# Patient Record
Sex: FEMALE | Race: WHITE | NOT HISPANIC OR LATINO
[De-identification: names, ages, dates, MRNs, and addresses within clinical notes are randomized per-mention and may not be internally consistent; named-entity substitution may affect disease eponyms.]

---

## 2017-06-16 ENCOUNTER — TRANSCRIPTION ENCOUNTER (OUTPATIENT)
Age: 82
End: 2017-06-16

## 2017-06-16 ENCOUNTER — OUTPATIENT (OUTPATIENT)
Dept: OUTPATIENT SERVICES | Facility: HOSPITAL | Age: 82
LOS: 1 days | End: 2017-06-16
Payer: MEDICARE

## 2017-06-16 VITALS
WEIGHT: 160.5 LBS | OXYGEN SATURATION: 99 % | RESPIRATION RATE: 13 BRPM | DIASTOLIC BLOOD PRESSURE: 57 MMHG | HEART RATE: 54 BPM | HEIGHT: 63.5 IN | SYSTOLIC BLOOD PRESSURE: 144 MMHG | TEMPERATURE: 98 F

## 2017-06-16 VITALS
SYSTOLIC BLOOD PRESSURE: 115 MMHG | OXYGEN SATURATION: 97 % | RESPIRATION RATE: 21 BRPM | HEART RATE: 56 BPM | DIASTOLIC BLOOD PRESSURE: 42 MMHG

## 2017-06-16 DIAGNOSIS — H25.11 AGE-RELATED NUCLEAR CATARACT, RIGHT EYE: ICD-10-CM

## 2017-06-16 PROCEDURE — 66982 XCAPSL CTRC RMVL CPLX WO ECP: CPT | Mod: RT

## 2017-06-16 NOTE — ASU PATIENT PROFILE, ADULT - PSH
AF (paroxysmal atrial fibrillation)  x 2 cardioversions - 9/2008, 2009 - chemical  Obesity (BMI 30-39.9)    S/P hysterectomy  2003  S/P lumpectomy of breast  left breast - with radiation 1990  S/P mastectomy, bilateral  january 2002  S/P shoulder surgery  right humerus fracture repair 1999

## 2017-06-16 NOTE — ASU DISCHARGE PLAN (ADULT/PEDIATRIC). - NOTIFY
Swelling that continues/Bleeding that does not stop/Fever greater than 101/Persistent Nausea and Vomiting/Pain not relieved by Medications

## 2017-06-16 NOTE — ASU PATIENT PROFILE, ADULT - PMH
Aortic stenosis  severe  Breast cancer  left breast  Hyperlipidemia    Hypertension    Paroxysmal a-fib

## 2017-09-01 ENCOUNTER — OUTPATIENT (OUTPATIENT)
Dept: OUTPATIENT SERVICES | Facility: HOSPITAL | Age: 82
LOS: 1 days | End: 2017-09-01
Payer: MEDICARE

## 2017-09-01 ENCOUNTER — APPOINTMENT (OUTPATIENT)
Dept: CARDIOLOGY | Facility: CLINIC | Age: 82
End: 2017-09-01
Payer: MEDICARE

## 2017-09-01 DIAGNOSIS — R07.9 CHEST PAIN, UNSPECIFIED: ICD-10-CM

## 2017-09-01 PROCEDURE — 75574 CT ANGIO HRT W/3D IMAGE: CPT | Mod: 26

## 2017-09-01 PROCEDURE — 75574 CT ANGIO HRT W/3D IMAGE: CPT

## 2017-09-11 ENCOUNTER — APPOINTMENT (OUTPATIENT)
Dept: CARDIOTHORACIC SURGERY | Facility: HOSPITAL | Age: 82
End: 2017-09-11

## 2017-09-11 ENCOUNTER — APPOINTMENT (OUTPATIENT)
Dept: CV DIAGNOSITCS | Facility: HOSPITAL | Age: 82
End: 2017-09-11

## 2017-09-26 ENCOUNTER — APPOINTMENT (OUTPATIENT)
Dept: CV DIAGNOSITCS | Facility: HOSPITAL | Age: 82
End: 2017-09-26

## 2017-09-26 ENCOUNTER — APPOINTMENT (OUTPATIENT)
Dept: CARDIOTHORACIC SURGERY | Facility: CLINIC | Age: 82
End: 2017-09-26
Payer: MEDICARE

## 2017-09-26 ENCOUNTER — OUTPATIENT (OUTPATIENT)
Dept: OUTPATIENT SERVICES | Facility: HOSPITAL | Age: 82
LOS: 1 days | End: 2017-09-26
Payer: SUBSIDIZED

## 2017-09-26 VITALS
RESPIRATION RATE: 14 BRPM | OXYGEN SATURATION: 98 % | DIASTOLIC BLOOD PRESSURE: 71 MMHG | TEMPERATURE: 97.5 F | SYSTOLIC BLOOD PRESSURE: 164 MMHG | HEIGHT: 64 IN | HEART RATE: 60 BPM | WEIGHT: 158 LBS | BODY MASS INDEX: 26.98 KG/M2

## 2017-09-26 DIAGNOSIS — I25.10 ATHEROSCLEROTIC HEART DISEASE OF NATIVE CORONARY ARTERY WITHOUT ANGINA PECTORIS: ICD-10-CM

## 2017-09-26 PROCEDURE — 93306 TTE W/DOPPLER COMPLETE: CPT

## 2017-09-26 PROCEDURE — 99213 OFFICE O/P EST LOW 20 MIN: CPT

## 2017-09-26 PROCEDURE — 93306 TTE W/DOPPLER COMPLETE: CPT | Mod: 26

## 2018-09-17 ENCOUNTER — APPOINTMENT (OUTPATIENT)
Dept: CARDIOTHORACIC SURGERY | Facility: CLINIC | Age: 83
End: 2018-09-17

## 2018-09-17 ENCOUNTER — APPOINTMENT (OUTPATIENT)
Dept: CV DIAGNOSITCS | Facility: HOSPITAL | Age: 83
End: 2018-09-17

## 2018-09-17 ENCOUNTER — APPOINTMENT (OUTPATIENT)
Dept: CARDIOTHORACIC SURGERY | Facility: HOSPITAL | Age: 83
End: 2018-09-17

## 2018-09-17 ENCOUNTER — NON-APPOINTMENT (OUTPATIENT)
Age: 83
End: 2018-09-17

## 2018-09-17 ENCOUNTER — OUTPATIENT (OUTPATIENT)
Dept: OUTPATIENT SERVICES | Facility: HOSPITAL | Age: 83
LOS: 1 days | End: 2018-09-17
Payer: SUBSIDIZED

## 2018-09-17 VITALS
TEMPERATURE: 98 F | SYSTOLIC BLOOD PRESSURE: 170 MMHG | BODY MASS INDEX: 25.27 KG/M2 | WEIGHT: 148 LBS | HEIGHT: 64 IN | OXYGEN SATURATION: 97 % | DIASTOLIC BLOOD PRESSURE: 77 MMHG | RESPIRATION RATE: 12 BRPM | HEART RATE: 65 BPM

## 2018-09-17 DIAGNOSIS — Z95.3 PRESENCE OF XENOGENIC HEART VALVE: ICD-10-CM

## 2018-09-17 DIAGNOSIS — I35.0 NONRHEUMATIC AORTIC (VALVE) STENOSIS: ICD-10-CM

## 2018-09-17 PROCEDURE — 93306 TTE W/DOPPLER COMPLETE: CPT

## 2018-09-17 PROCEDURE — 93306 TTE W/DOPPLER COMPLETE: CPT | Mod: 26

## 2018-10-09 PROBLEM — Z95.3 STATUS POST AORTIC VALVE REPLACEMENT WITH BIOPROSTHETIC VALVE: Status: ACTIVE | Noted: 2017-09-26

## 2018-10-25 ENCOUNTER — INPATIENT (INPATIENT)
Facility: HOSPITAL | Age: 83
LOS: 7 days | Discharge: NURSING, REHAB OR LONG-TERM CARE | DRG: 543 | End: 2018-11-02
Attending: INTERNAL MEDICINE | Admitting: INTERNAL MEDICINE
Payer: MEDICARE

## 2018-10-25 VITALS
RESPIRATION RATE: 17 BRPM | HEART RATE: 57 BPM | WEIGHT: 160.06 LBS | DIASTOLIC BLOOD PRESSURE: 83 MMHG | HEIGHT: 64 IN | TEMPERATURE: 98 F | SYSTOLIC BLOOD PRESSURE: 156 MMHG

## 2018-10-25 DIAGNOSIS — M54.5 LOW BACK PAIN: ICD-10-CM

## 2018-10-25 LAB
APPEARANCE UR: CLEAR — SIGNIFICANT CHANGE UP
APTT BLD: 39.8 SEC — HIGH (ref 27.5–37.4)
BILIRUB UR-MCNC: NEGATIVE — SIGNIFICANT CHANGE UP
COLOR SPEC: YELLOW — SIGNIFICANT CHANGE UP
DIFF PNL FLD: NEGATIVE — SIGNIFICANT CHANGE UP
GLUCOSE UR QL: NEGATIVE MG/DL — SIGNIFICANT CHANGE UP
HCT VFR BLD CALC: 40.5 % — SIGNIFICANT CHANGE UP (ref 34.5–45)
HGB BLD-MCNC: 13.6 G/DL — SIGNIFICANT CHANGE UP (ref 11.5–15.5)
INR BLD: 2.27 RATIO — HIGH (ref 0.88–1.16)
KETONES UR-MCNC: NEGATIVE — SIGNIFICANT CHANGE UP
LEUKOCYTE ESTERASE UR-ACNC: ABNORMAL
MCHC RBC-ENTMCNC: 30.9 PG — SIGNIFICANT CHANGE UP (ref 27–34)
MCHC RBC-ENTMCNC: 33.6 GM/DL — SIGNIFICANT CHANGE UP (ref 32–36)
MCV RBC AUTO: 92 FL — SIGNIFICANT CHANGE UP (ref 80–100)
NITRITE UR-MCNC: NEGATIVE — SIGNIFICANT CHANGE UP
NRBC # BLD: 0 /100 WBCS — SIGNIFICANT CHANGE UP (ref 0–0)
PH UR: 8 — SIGNIFICANT CHANGE UP (ref 5–8)
PLATELET # BLD AUTO: 143 K/UL — LOW (ref 150–400)
PROT UR-MCNC: NEGATIVE MG/DL — SIGNIFICANT CHANGE UP
PROTHROM AB SERPL-ACNC: 25.2 SEC — HIGH (ref 9.8–12.7)
RBC # BLD: 4.4 M/UL — SIGNIFICANT CHANGE UP (ref 3.8–5.2)
RBC # FLD: 13.3 % — SIGNIFICANT CHANGE UP (ref 10.3–14.5)
RBC CASTS # UR COMP ASSIST: SIGNIFICANT CHANGE UP /HPF (ref 0–4)
SP GR SPEC: 1.01 — SIGNIFICANT CHANGE UP (ref 1.01–1.02)
UROBILINOGEN FLD QL: NEGATIVE MG/DL — SIGNIFICANT CHANGE UP
WBC # BLD: 19.45 K/UL — HIGH (ref 3.8–10.5)
WBC # FLD AUTO: 19.45 K/UL — HIGH (ref 3.8–10.5)
WBC UR QL: SIGNIFICANT CHANGE UP

## 2018-10-25 PROCEDURE — 72100 X-RAY EXAM L-S SPINE 2/3 VWS: CPT | Mod: 26

## 2018-10-25 PROCEDURE — 99285 EMERGENCY DEPT VISIT HI MDM: CPT

## 2018-10-25 PROCEDURE — 93010 ELECTROCARDIOGRAM REPORT: CPT

## 2018-10-25 RX ORDER — HYDROCHLOROTHIAZIDE 25 MG
50 TABLET ORAL DAILY
Qty: 0 | Refills: 0 | Status: DISCONTINUED | OUTPATIENT
Start: 2018-10-25 | End: 2018-10-30

## 2018-10-25 RX ORDER — TRAMADOL HYDROCHLORIDE 50 MG/1
50 TABLET ORAL ONCE
Qty: 0 | Refills: 0 | Status: DISCONTINUED | OUTPATIENT
Start: 2018-10-25 | End: 2018-10-25

## 2018-10-25 RX ORDER — ATORVASTATIN CALCIUM 80 MG/1
20 TABLET, FILM COATED ORAL AT BEDTIME
Qty: 0 | Refills: 0 | Status: DISCONTINUED | OUTPATIENT
Start: 2018-10-25 | End: 2018-11-02

## 2018-10-25 RX ORDER — WARFARIN SODIUM 2.5 MG/1
2.5 TABLET ORAL ONCE
Qty: 0 | Refills: 0 | Status: DISCONTINUED | OUTPATIENT
Start: 2018-10-25 | End: 2018-10-26

## 2018-10-25 RX ORDER — ACETAMINOPHEN 500 MG
650 TABLET ORAL EVERY 6 HOURS
Qty: 0 | Refills: 0 | Status: DISCONTINUED | OUTPATIENT
Start: 2018-10-25 | End: 2018-11-02

## 2018-10-25 RX ORDER — DIAZEPAM 5 MG
5 TABLET ORAL EVERY 12 HOURS
Qty: 0 | Refills: 0 | Status: DISCONTINUED | OUTPATIENT
Start: 2018-10-25 | End: 2018-11-01

## 2018-10-25 RX ORDER — TRAMADOL HYDROCHLORIDE 50 MG/1
50 TABLET ORAL EVERY 6 HOURS
Qty: 0 | Refills: 0 | Status: DISCONTINUED | OUTPATIENT
Start: 2018-10-25 | End: 2018-11-01

## 2018-10-25 RX ORDER — AMIODARONE HYDROCHLORIDE 400 MG/1
50 TABLET ORAL
Qty: 0 | Refills: 0 | Status: DISCONTINUED | OUTPATIENT
Start: 2018-10-25 | End: 2018-10-26

## 2018-10-25 RX ORDER — METOPROLOL TARTRATE 50 MG
25 TABLET ORAL DAILY
Qty: 0 | Refills: 0 | Status: DISCONTINUED | OUTPATIENT
Start: 2018-10-25 | End: 2018-11-02

## 2018-10-25 RX ORDER — LISINOPRIL 2.5 MG/1
10 TABLET ORAL DAILY
Qty: 0 | Refills: 0 | Status: DISCONTINUED | OUTPATIENT
Start: 2018-10-25 | End: 2018-11-02

## 2018-10-25 RX ADMIN — TRAMADOL HYDROCHLORIDE 50 MILLIGRAM(S): 50 TABLET ORAL at 20:30

## 2018-10-25 RX ADMIN — TRAMADOL HYDROCHLORIDE 50 MILLIGRAM(S): 50 TABLET ORAL at 19:59

## 2018-10-25 NOTE — ED PROVIDER NOTE - NS_ ATTENDINGSCRIBEDETAILS _ED_A_ED_FT
.scribe The scribe's documentation has been prepared under my direction and personally reviewed by me in its entirety. I confirm that the note above accurately reflects all work, treatment, procedures, and medical decision making performed by me.

## 2018-10-25 NOTE — ED ADULT NURSE NOTE - ED STAT RN HANDOFF DETAILS
Patient unable to ambulate due to pain and prefers to be admitted. Awaiting admission. IV SL patent right hand. Moving all extremities equally. Patient endorsed to Ayleen García RN

## 2018-10-25 NOTE — ED PROVIDER NOTE - OBJECTIVE STATEMENT
84 y/o F pt with PMHx of severe spinal stenosis of lumbar region, herniated disc, AFib in 10/2016, mitral valve replacement presents to the ED c/o sudden onset of generalized lower back pain today. Pt was picking up a box in the garage when grasshoppers jumped in her face, she fell backwards and hit the lower region of the back where she has the spinal stenosis. She saw an orthopedist and had an MRI done 2 years ago. Pt takes Coumadin, desires to have Coumadin levels checked. Reports she occasionally needs to walk with a walker. Denies hitting head, legs, HA, currently having AFib. No further complaints at this time.

## 2018-10-25 NOTE — ED ADULT NURSE NOTE - NSIMPLEMENTINTERV_GEN_ALL_ED
Implemented All Fall with Harm Risk Interventions:  [place] to call system. Call bell, personal items and telephone within reach. Instruct patient to call for assistance. Room bathroom lighting operational. Non-slip footwear when patient is off stretcher. Physically safe environment: no spills, clutter or unnecessary equipment. Stretcher in lowest position, wheels locked, appropriate side rails in place. Provide visual cue, wrist band, yellow gown, etc. Monitor gait and stability. Monitor for mental status changes and reorient to person, place, and time. Review medications for side effects contributing to fall risk. Reinforce activity limits and safety measures with patient and family. Provide visual clues: red socks.

## 2018-10-25 NOTE — ED ADULT NURSE NOTE - STRIKING AGAINST
"Anesthesia Release from PACU Note    Patient: Abdias Kim    Procedure(s) Performed: Procedure(s) (LRB):  COLONOSCOPY (N/A)    Anesthesia type: general    Post pain: Adequate analgesia    Post assessment: no apparent anesthetic complications, tolerated procedure well and no evidence of recall    Last Vitals:   Visit Vitals    BP (!) 153/82    Pulse (!) 56    Temp 36.6 °C (97.8 °F) (Axillary)    Resp 18    Ht 5' 7" (1.702 m)    Wt 71.7 kg (158 lb)    SpO2 100%    BMI 24.75 kg/m2       Post vital signs: stable    Level of consciousness: awake, alert  and oriented    Nausea/Vomiting: no nausea/no vomiting    Complications: none    Airway Patency: patent    Respiratory: unassisted    Cardiovascular: stable and blood pressure at baseline    Hydration: euvolemic  "
cement

## 2018-10-25 NOTE — ED ADULT NURSE NOTE - CAS EDN DISCHARGE ASSESSMENT
Alert and oriented to person, place and time/Awake/Symptoms improved/No adverse reaction to first time med in ED

## 2018-10-25 NOTE — ED ADULT NURSE NOTE - OBJECTIVE STATEMENT
Patient fell backward in her garage when she picked up something and bugs flew out into her face and startled her. Denies head trauma. C/O low back pain. Patient was ambulatory at the scene.

## 2018-10-25 NOTE — ED ADULT NURSE NOTE - PMH
Aortic stenosis  severe  Breast cancer  left breast  Hyperlipidemia    Hypertension    Lumbar herniated disc    Paroxysmal a-fib    Spinal stenosis of lumbar region

## 2018-10-25 NOTE — ED PROVIDER NOTE - MEDICAL DECISION MAKING DETAILS
82 y/o F pt presents to the ED c/o sudden onset of generalized lower back pain today, desires to have Coumadin levels checked. Will do labs and CT L-Spine then reassess.

## 2018-10-25 NOTE — ED PROVIDER NOTE - CARE PLAN
Principal Discharge DX:	Acute low back pain without sciatica, unspecified back pain laterality  Secondary Diagnosis:	Fall, initial encounter Principal Discharge DX:	Acute low back pain without sciatica, unspecified back pain laterality  Secondary Diagnosis:	Fall, initial encounter  Secondary Diagnosis:	Closed compression fracture of second lumbar vertebra, initial encounter

## 2018-10-25 NOTE — ED ADULT TRIAGE NOTE - CHIEF COMPLAINT QUOTE
"I was startled in my garage when I picked up a basket and bugs flew out of in my face. I fell backward and hurt my low back."

## 2018-10-25 NOTE — ED PROVIDER NOTE - PROGRESS NOTE DETAILS
pt recvd in signout from dr Mccormick, she does now not want to be admitted, will give po meds and re asses for dc she has been on the phone with her family (son is a physician) pt re eval she is comfortable but still unable to sit up or move about her bed, agrees to admit. dr quintero on call medicine paged pmd dr steven goldberg in [place]

## 2018-10-25 NOTE — ED ADULT NURSE REASSESSMENT NOTE - NS ED NURSE REASSESS COMMENT FT1
Attempted to assist patient OOB to see if she can ambulate. Patient unable to sit up and get off the stretcher because her right lower back pain is too severe. Initially patient had declined pain med but now feels she is ready for pain med. Dr. Mccormick aware. Patient also states she lives alone and she does not feel she is able to go home as she is in too much pain. Dr. Mccormick aware.
Patient refused CT as ordered states she prefers a plain x-ray or an MRI. Dr. Mccormick aware. Plain x-ray to be ordered.
Pt received awake and alert, assisted to reposition for comfort. Pt is c/o lower back pain at rest, worse with movement. Pt able to move all extremities equal and strong, denies any numbness or tingling. Pt is unable to ambulate at this time and wants to be admitted to the hospital. Plan to be d/w MD.
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
## 2018-10-26 DIAGNOSIS — M51.26 OTHER INTERVERTEBRAL DISC DISPLACEMENT, LUMBAR REGION: ICD-10-CM

## 2018-10-26 DIAGNOSIS — E78.5 HYPERLIPIDEMIA, UNSPECIFIED: ICD-10-CM

## 2018-10-26 DIAGNOSIS — Z29.9 ENCOUNTER FOR PROPHYLACTIC MEASURES, UNSPECIFIED: ICD-10-CM

## 2018-10-26 DIAGNOSIS — I10 ESSENTIAL (PRIMARY) HYPERTENSION: ICD-10-CM

## 2018-10-26 DIAGNOSIS — M54.5 LOW BACK PAIN: ICD-10-CM

## 2018-10-26 DIAGNOSIS — I48.0 PAROXYSMAL ATRIAL FIBRILLATION: ICD-10-CM

## 2018-10-26 LAB
ALBUMIN SERPL ELPH-MCNC: 3.7 G/DL — SIGNIFICANT CHANGE UP (ref 3.3–5)
ALP SERPL-CCNC: 94 U/L — SIGNIFICANT CHANGE UP (ref 30–120)
ALT FLD-CCNC: 28 U/L DA — SIGNIFICANT CHANGE UP (ref 10–60)
ANION GAP SERPL CALC-SCNC: 7 MMOL/L — SIGNIFICANT CHANGE UP (ref 5–17)
ANION GAP SERPL CALC-SCNC: 7 MMOL/L — SIGNIFICANT CHANGE UP (ref 5–17)
AST SERPL-CCNC: 21 U/L — SIGNIFICANT CHANGE UP (ref 10–40)
BASOPHILS # BLD AUTO: 0.02 K/UL — SIGNIFICANT CHANGE UP (ref 0–0.2)
BASOPHILS NFR BLD AUTO: 0.2 % — SIGNIFICANT CHANGE UP (ref 0–2)
BILIRUB SERPL-MCNC: 0.9 MG/DL — SIGNIFICANT CHANGE UP (ref 0.2–1.2)
BUN SERPL-MCNC: 29 MG/DL — HIGH (ref 7–23)
BUN SERPL-MCNC: 31 MG/DL — HIGH (ref 7–23)
CALCIUM SERPL-MCNC: 9.3 MG/DL — SIGNIFICANT CHANGE UP (ref 8.4–10.5)
CALCIUM SERPL-MCNC: 9.6 MG/DL — SIGNIFICANT CHANGE UP (ref 8.4–10.5)
CHLORIDE SERPL-SCNC: 101 MMOL/L — SIGNIFICANT CHANGE UP (ref 96–108)
CHLORIDE SERPL-SCNC: 101 MMOL/L — SIGNIFICANT CHANGE UP (ref 96–108)
CHOLEST SERPL-MCNC: 141 MG/DL — SIGNIFICANT CHANGE UP (ref 10–199)
CO2 SERPL-SCNC: 30 MMOL/L — SIGNIFICANT CHANGE UP (ref 22–31)
CO2 SERPL-SCNC: 31 MMOL/L — SIGNIFICANT CHANGE UP (ref 22–31)
CREAT SERPL-MCNC: 1.11 MG/DL — SIGNIFICANT CHANGE UP (ref 0.5–1.3)
CREAT SERPL-MCNC: 1.18 MG/DL — SIGNIFICANT CHANGE UP (ref 0.5–1.3)
EOSINOPHIL # BLD AUTO: 0.19 K/UL — SIGNIFICANT CHANGE UP (ref 0–0.5)
EOSINOPHIL NFR BLD AUTO: 1.7 % — SIGNIFICANT CHANGE UP (ref 0–6)
GLUCOSE SERPL-MCNC: 113 MG/DL — HIGH (ref 70–99)
GLUCOSE SERPL-MCNC: 129 MG/DL — HIGH (ref 70–99)
HBA1C BLD-MCNC: 5.3 % — SIGNIFICANT CHANGE UP (ref 4–5.6)
HCT VFR BLD CALC: 35.2 % — SIGNIFICANT CHANGE UP (ref 34.5–45)
HCT VFR BLD CALC: 38.5 % — SIGNIFICANT CHANGE UP (ref 34.5–45)
HDLC SERPL-MCNC: 61 MG/DL — SIGNIFICANT CHANGE UP
HGB BLD-MCNC: 12 G/DL — SIGNIFICANT CHANGE UP (ref 11.5–15.5)
HGB BLD-MCNC: 13 G/DL — SIGNIFICANT CHANGE UP (ref 11.5–15.5)
IMM GRANULOCYTES NFR BLD AUTO: 0.2 % — SIGNIFICANT CHANGE UP (ref 0–1.5)
INR BLD: 2.25 RATIO — HIGH (ref 0.88–1.16)
LIPID PNL WITH DIRECT LDL SERPL: 68 MG/DL — SIGNIFICANT CHANGE UP
LYMPHOCYTES # BLD AUTO: 1.12 K/UL — SIGNIFICANT CHANGE UP (ref 1–3.3)
LYMPHOCYTES # BLD AUTO: 9.9 % — LOW (ref 13–44)
MAGNESIUM SERPL-MCNC: 2 MG/DL — SIGNIFICANT CHANGE UP (ref 1.6–2.6)
MCHC RBC-ENTMCNC: 30.5 PG — SIGNIFICANT CHANGE UP (ref 27–34)
MCHC RBC-ENTMCNC: 30.6 PG — SIGNIFICANT CHANGE UP (ref 27–34)
MCHC RBC-ENTMCNC: 33.8 GM/DL — SIGNIFICANT CHANGE UP (ref 32–36)
MCHC RBC-ENTMCNC: 34.1 GM/DL — SIGNIFICANT CHANGE UP (ref 32–36)
MCV RBC AUTO: 89.6 FL — SIGNIFICANT CHANGE UP (ref 80–100)
MCV RBC AUTO: 90.6 FL — SIGNIFICANT CHANGE UP (ref 80–100)
MONOCYTES # BLD AUTO: 0.78 K/UL — SIGNIFICANT CHANGE UP (ref 0–0.9)
MONOCYTES NFR BLD AUTO: 6.9 % — SIGNIFICANT CHANGE UP (ref 2–14)
NEUTROPHILS # BLD AUTO: 9.22 K/UL — HIGH (ref 1.8–7.4)
NEUTROPHILS NFR BLD AUTO: 81.1 % — HIGH (ref 43–77)
NRBC # BLD: 0 /100 WBCS — SIGNIFICANT CHANGE UP (ref 0–0)
PHOSPHATE SERPL-MCNC: 3.4 MG/DL — SIGNIFICANT CHANGE UP (ref 2.5–4.5)
PLATELET # BLD AUTO: 129 K/UL — LOW (ref 150–400)
PLATELET # BLD AUTO: 132 K/UL — LOW (ref 150–400)
POTASSIUM SERPL-MCNC: 4 MMOL/L — SIGNIFICANT CHANGE UP (ref 3.5–5.3)
POTASSIUM SERPL-MCNC: 4 MMOL/L — SIGNIFICANT CHANGE UP (ref 3.5–5.3)
POTASSIUM SERPL-SCNC: 4 MMOL/L — SIGNIFICANT CHANGE UP (ref 3.5–5.3)
POTASSIUM SERPL-SCNC: 4 MMOL/L — SIGNIFICANT CHANGE UP (ref 3.5–5.3)
PROT SERPL-MCNC: 7.2 G/DL — SIGNIFICANT CHANGE UP (ref 6–8.3)
PROTHROM AB SERPL-ACNC: 24.9 SEC — HIGH (ref 9.8–12.7)
RBC # BLD: 3.93 M/UL — SIGNIFICANT CHANGE UP (ref 3.8–5.2)
RBC # BLD: 4.25 M/UL — SIGNIFICANT CHANGE UP (ref 3.8–5.2)
RBC # FLD: 13.2 % — SIGNIFICANT CHANGE UP (ref 10.3–14.5)
RBC # FLD: 13.4 % — SIGNIFICANT CHANGE UP (ref 10.3–14.5)
SODIUM SERPL-SCNC: 138 MMOL/L — SIGNIFICANT CHANGE UP (ref 135–145)
SODIUM SERPL-SCNC: 139 MMOL/L — SIGNIFICANT CHANGE UP (ref 135–145)
TOTAL CHOLESTEROL/HDL RATIO MEASUREMENT: 2.3 RATIO — LOW (ref 3.3–7.1)
TRIGL SERPL-MCNC: 60 MG/DL — SIGNIFICANT CHANGE UP (ref 10–149)
WBC # BLD: 11.35 K/UL — HIGH (ref 3.8–10.5)
WBC # BLD: 14.29 K/UL — HIGH (ref 3.8–10.5)
WBC # FLD AUTO: 11.35 K/UL — HIGH (ref 3.8–10.5)
WBC # FLD AUTO: 14.29 K/UL — HIGH (ref 3.8–10.5)

## 2018-10-26 PROCEDURE — 71045 X-RAY EXAM CHEST 1 VIEW: CPT | Mod: 26

## 2018-10-26 RX ORDER — AMIODARONE HYDROCHLORIDE 400 MG/1
100 TABLET ORAL ONCE
Qty: 0 | Refills: 0 | Status: COMPLETED | OUTPATIENT
Start: 2018-10-26 | End: 2018-10-26

## 2018-10-26 RX ORDER — WARFARIN SODIUM 2.5 MG/1
2.5 TABLET ORAL ONCE
Qty: 0 | Refills: 0 | Status: COMPLETED | OUTPATIENT
Start: 2018-10-26 | End: 2018-10-26

## 2018-10-26 RX ORDER — AMIODARONE HYDROCHLORIDE 400 MG/1
100 TABLET ORAL
Qty: 0 | Refills: 0 | Status: DISCONTINUED | OUTPATIENT
Start: 2018-10-29 | End: 2018-11-02

## 2018-10-26 RX ADMIN — Medication 25 MILLIGRAM(S): at 21:18

## 2018-10-26 RX ADMIN — LISINOPRIL 10 MILLIGRAM(S): 2.5 TABLET ORAL at 17:40

## 2018-10-26 RX ADMIN — TRAMADOL HYDROCHLORIDE 50 MILLIGRAM(S): 50 TABLET ORAL at 17:40

## 2018-10-26 RX ADMIN — Medication 5 MILLIGRAM(S): at 19:56

## 2018-10-26 RX ADMIN — TRAMADOL HYDROCHLORIDE 50 MILLIGRAM(S): 50 TABLET ORAL at 18:15

## 2018-10-26 RX ADMIN — AMIODARONE HYDROCHLORIDE 100 MILLIGRAM(S): 400 TABLET ORAL at 11:30

## 2018-10-26 RX ADMIN — WARFARIN SODIUM 2.5 MILLIGRAM(S): 2.5 TABLET ORAL at 21:18

## 2018-10-26 RX ADMIN — ATORVASTATIN CALCIUM 20 MILLIGRAM(S): 80 TABLET, FILM COATED ORAL at 21:18

## 2018-10-26 RX ADMIN — TRAMADOL HYDROCHLORIDE 50 MILLIGRAM(S): 50 TABLET ORAL at 02:55

## 2018-10-26 RX ADMIN — TRAMADOL HYDROCHLORIDE 50 MILLIGRAM(S): 50 TABLET ORAL at 09:18

## 2018-10-26 RX ADMIN — TRAMADOL HYDROCHLORIDE 50 MILLIGRAM(S): 50 TABLET ORAL at 00:03

## 2018-10-26 RX ADMIN — TRAMADOL HYDROCHLORIDE 50 MILLIGRAM(S): 50 TABLET ORAL at 09:50

## 2018-10-26 NOTE — H&P ADULT - NSHPSOCIALHISTORY_GEN_ALL_CORE
Social History:    Marital Status:   Occupation:   Lives with:     Substance Use :  Tobacco Usage:  (   ) never smoked   (   ) former smoker   (   ) current smoker  (     ) pack year  (        ) last tobacco use date  Alcohol Usage:    (     ) Advanced Directives: (     ) declined   [  ] health care proxy Social History:    Marital Status:   Occupation: Retired  Lives with: Alone    Substance Use :  Tobacco Usage:  (X) never smoked   (   ) former smoker   (   ) current smoker  (     ) pack year  (        ) last tobacco use date  Alcohol Usage: Denies    (X) Advanced Directives: (     ) declined   [X] health care proxy

## 2018-10-26 NOTE — H&P ADULT - ASSESSMENT
83 years old female with past medical history of Atrial Fibrillation, Severe spinal stenosis, herniated disc in back, Mitral valve replacement, Old compression fracture of lumbar spine, who fell backwards on her buttocks while picking up a box in her garage. Patient developed back pain and was brought in to ER.     She was given pain medications and was tried to be ambulated. Patient couldn't ambulate and was admitted for pain control and further management.

## 2018-10-26 NOTE — H&P ADULT - NSHPREVIEWOFSYSTEMS_GEN_ALL_CORE
REVIEW OF SYSTEMS:  CONSTITUTIONAL: No fever, weight loss, or fatigue  EYES: No eye pain, visual disturbances, or discharge  ENMT:  No difficulty hearing, tinnitus, vertigo; No sinus or throat pain  NECK: No pain or stiffness  BREASTS: No pain, masses, or nipple discharge  RESPIRATORY: No cough, wheezing, chills or hemoptysis; No shortness of breath  CARDIOVASCULAR: No chest pain, palpitations, dizziness, or leg swelling  GASTROINTESTINAL: No abdominal or epigastric pain. No nausea, vomiting, or hematemesis; No diarrhea or constipation. No melena or hematochezia.  GENITOURINARY: No dysuria, frequency, hematuria, or incontinence  NEUROLOGICAL: No headaches, memory loss, loss of strength, numbness, or tremors  SKIN: No itching, burning, rashes, or lesions   LYMPH NODES: No enlarged glands  ENDOCRINE: No heat or cold intolerance; No hair loss; No polydipsia or polyuria  MUSCULOSKELETAL: + back pain  PSYCHIATRIC: No depression, anxiety, mood swings, or difficulty sleeping  HEME/LYMPH: No easy bruising, or bleeding gums  ALLERGY AND IMMUNOLOGIC: No hives or eczema

## 2018-10-26 NOTE — H&P ADULT - PROBLEM SELECTOR PLAN 1
Patient is acute low back pain possibly secondary to contusion from fall.  Will get MRI of the lumbosacral spine to rule out any Acute compression fractures.  Continue patient on pain medications as needed.  Encourage ambulation with physical therapy.  Will benefit from rehabilitation.

## 2018-10-26 NOTE — DIETITIAN INITIAL EVALUATION ADULT. - OTHER INFO
84 y/o F pt seen as per coumadin policy. Pt has PMHx of spinal stenosis, AFib, mitral valve replacement presents to the ED c/o lower back pain after a fall. States PTA oral intake is good and UBW is in the 150s (admission weight 159# 10/25). Pt is aware of coumadin interactions and alters med dose to her PT/INR. States she has a family member who is a cardiologist and oversees this. Pt states was not able to eat breakfast secondary to lower back pain. Mentions difficulty sitting up to eat meals, recommending tray set up to assist. Preferences were obtained. Allergic to caffeine. Skin is intact. Nutrition remains available PRN.

## 2018-10-26 NOTE — H&P ADULT - HISTORY OF PRESENT ILLNESS
83 years old female with past medical history of Atrial Fibrillation, Severe spinal stenosis, herniated disc in back, Mitral valve replacement, Old compression fracture of lumbar spine, who fell backwards on her buttocks while picking up a box in her garage. Patient developed back pain and was brought in to ER.     She was given pain medications and was tried to be ambulated. Patient couldn't ambulate and was admitted for pain control and further management.     patient continues to c/o back pain. Denies any numbness or weakness. No fever or chills. No bowel or bladder control issuers. No nausea or vomiting. No LOC. No head injury.

## 2018-10-26 NOTE — H&P ADULT - NSHPLABSRESULTS_GEN_ALL_CORE
12.0   11.35 )-----------( 129      ( 26 Oct 2018 06:48 )             35.2     26 Oct 2018 06:48    138    |  101    |  29     ----------------------------<  113    4.0     |  30     |  1.11     Ca    9.3        26 Oct 2018 06:48  Phos  3.4       26 Oct 2018 06:48  Mg     2.0       26 Oct 2018 06:48    TPro  7.2    /  Alb  3.7    /  TBili  0.9    /  DBili  x      /  AST  21     /  ALT  28     /  AlkPhos  94     26 Oct 2018 00:49    CAPILLARY BLOOD GLUCOSE    PT/INR - ( 26 Oct 2018 06:48 )   PT: 24.9 sec;   INR: 2.25 ratio         PTT - ( 25 Oct 2018 15:59 )  PTT:39.8 sec  10-26 HgdbpqwrgxT1C 5.3    10-26 Chol 141 LDL 68 HDL 61 Trig 60

## 2018-10-26 NOTE — H&P ADULT - PROBLEM SELECTOR PLAN 5
Continue patient on lisinopril, hydrochlorothiazide and metoprolol with holding parameters.  Monitor blood pressure per routine.

## 2018-10-26 NOTE — PHYSICAL THERAPY INITIAL EVALUATION ADULT - PERTINENT HX OF CURRENT PROBLEM, REHAB EVAL
Pt. s/p fall.  Pt. was in her garage lifting a box when crickets jumped out and caused pt fell backwards.  L-Spine xray->old L2 compression fx.

## 2018-10-26 NOTE — H&P ADULT - NSHPPHYSICALEXAM_GEN_ALL_CORE
Vital Signs Last 24 Hrs  T(C): 37.6 (26 Oct 2018 20:19), Max: 37.6 (26 Oct 2018 20:19)  T(F): 99.7 (26 Oct 2018 20:19), Max: 99.7 (26 Oct 2018 20:19)  HR: 70 (26 Oct 2018 20:19) (62 - 74)  BP: 122/66 (26 Oct 2018 20:19) (108/62 - 132/64)  BP(mean): --  RR: 16 (26 Oct 2018 20:19) (16 - 18)  SpO2: 94% (26 Oct 2018 20:19) (90% - 98%) Vital Signs Last 24 Hrs  T(C): 37.6 (26 Oct 2018 20:19), Max: 37.6 (26 Oct 2018 20:19)  T(F): 99.7 (26 Oct 2018 20:19), Max: 99.7 (26 Oct 2018 20:19)  HR: 70 (26 Oct 2018 20:19) (62 - 74)  BP: 122/66 (26 Oct 2018 20:19) (108/62 - 132/64)  BP(mean): --  RR: 16 (26 Oct 2018 20:19) (16 - 18)  SpO2: 94% (26 Oct 2018 20:19) (90% - 98%)    PHYSICAL EXAM:  GENERAL: NAD, well-groomed, well-developed  HEAD:  Atraumatic, Normocephalic  EYES: EOMI, PERRLA, conjunctiva and sclera clear  ENMT: Moist mucous membranes, No lesions  NECK: Supple,   CHEST/LUNG: Clear to auscultation bilaterally; No rales, rhonchi, wheezing, or rubs  HEART: S1, S2, SM +  ABDOMEN: Soft, Nontender, Nondistended; Bowel sounds present  EXTREMITIES:  2+ Peripheral Pulses, No clubbing, cyanosis, or edema  MS: Tenderness in lower back. No deformity noted.    LYMPH: No lymphadenopathy noted  SKIN: No rashes or lesions  NERVOUS SYSTEM:  No focal deficit.   PSYCH:  Awake and alert.

## 2018-10-27 LAB
ANION GAP SERPL CALC-SCNC: 7 MMOL/L — SIGNIFICANT CHANGE UP (ref 5–17)
BUN SERPL-MCNC: 26 MG/DL — HIGH (ref 7–23)
CALCIUM SERPL-MCNC: 9.1 MG/DL — SIGNIFICANT CHANGE UP (ref 8.4–10.5)
CHLORIDE SERPL-SCNC: 100 MMOL/L — SIGNIFICANT CHANGE UP (ref 96–108)
CO2 SERPL-SCNC: 30 MMOL/L — SIGNIFICANT CHANGE UP (ref 22–31)
CREAT SERPL-MCNC: 1.13 MG/DL — SIGNIFICANT CHANGE UP (ref 0.5–1.3)
GLUCOSE SERPL-MCNC: 106 MG/DL — HIGH (ref 70–99)
HCT VFR BLD CALC: 35 % — SIGNIFICANT CHANGE UP (ref 34.5–45)
HGB BLD-MCNC: 11.6 G/DL — SIGNIFICANT CHANGE UP (ref 11.5–15.5)
INR BLD: 2.52 RATIO — HIGH (ref 0.88–1.16)
MCHC RBC-ENTMCNC: 30.1 PG — SIGNIFICANT CHANGE UP (ref 27–34)
MCHC RBC-ENTMCNC: 33.1 GM/DL — SIGNIFICANT CHANGE UP (ref 32–36)
MCV RBC AUTO: 90.7 FL — SIGNIFICANT CHANGE UP (ref 80–100)
NRBC # BLD: 0 /100 WBCS — SIGNIFICANT CHANGE UP (ref 0–0)
PLATELET # BLD AUTO: 119 K/UL — LOW (ref 150–400)
POTASSIUM SERPL-MCNC: 4 MMOL/L — SIGNIFICANT CHANGE UP (ref 3.5–5.3)
POTASSIUM SERPL-SCNC: 4 MMOL/L — SIGNIFICANT CHANGE UP (ref 3.5–5.3)
PROTHROM AB SERPL-ACNC: 28 SEC — HIGH (ref 9.8–12.7)
RBC # BLD: 3.86 M/UL — SIGNIFICANT CHANGE UP (ref 3.8–5.2)
RBC # FLD: 13.8 % — SIGNIFICANT CHANGE UP (ref 10.3–14.5)
SODIUM SERPL-SCNC: 137 MMOL/L — SIGNIFICANT CHANGE UP (ref 135–145)
T3 SERPL-MCNC: 68 NG/DL — LOW (ref 80–200)
T4 AB SER-ACNC: 7 UG/DL — SIGNIFICANT CHANGE UP (ref 4.6–12)
TSH SERPL-MCNC: 1.84 UIU/ML — SIGNIFICANT CHANGE UP (ref 0.27–4.2)
WBC # BLD: 11.29 K/UL — HIGH (ref 3.8–10.5)
WBC # FLD AUTO: 11.29 K/UL — HIGH (ref 3.8–10.5)

## 2018-10-27 RX ORDER — LIDOCAINE 4 G/100G
1 CREAM TOPICAL DAILY
Qty: 0 | Refills: 0 | Status: DISCONTINUED | OUTPATIENT
Start: 2018-10-27 | End: 2018-11-02

## 2018-10-27 RX ORDER — WARFARIN SODIUM 2.5 MG/1
2.5 TABLET ORAL ONCE
Qty: 0 | Refills: 0 | Status: COMPLETED | OUTPATIENT
Start: 2018-10-27 | End: 2018-10-27

## 2018-10-27 RX ADMIN — ATORVASTATIN CALCIUM 20 MILLIGRAM(S): 80 TABLET, FILM COATED ORAL at 21:50

## 2018-10-27 RX ADMIN — Medication 5 MILLIGRAM(S): at 11:13

## 2018-10-27 RX ADMIN — TRAMADOL HYDROCHLORIDE 50 MILLIGRAM(S): 50 TABLET ORAL at 12:13

## 2018-10-27 RX ADMIN — LIDOCAINE 1 PATCH: 4 CREAM TOPICAL at 20:16

## 2018-10-27 RX ADMIN — WARFARIN SODIUM 2.5 MILLIGRAM(S): 2.5 TABLET ORAL at 21:50

## 2018-10-27 RX ADMIN — LIDOCAINE 1 PATCH: 4 CREAM TOPICAL at 16:46

## 2018-10-27 RX ADMIN — TRAMADOL HYDROCHLORIDE 50 MILLIGRAM(S): 50 TABLET ORAL at 11:13

## 2018-10-27 RX ADMIN — LISINOPRIL 10 MILLIGRAM(S): 2.5 TABLET ORAL at 08:49

## 2018-10-27 RX ADMIN — TRAMADOL HYDROCHLORIDE 50 MILLIGRAM(S): 50 TABLET ORAL at 01:00

## 2018-10-27 RX ADMIN — Medication 25 MILLIGRAM(S): at 17:40

## 2018-10-27 RX ADMIN — TRAMADOL HYDROCHLORIDE 50 MILLIGRAM(S): 50 TABLET ORAL at 00:24

## 2018-10-28 LAB
INR BLD: 2.22 RATIO — HIGH (ref 0.88–1.16)
PROTHROM AB SERPL-ACNC: 24.6 SEC — HIGH (ref 9.8–12.7)

## 2018-10-28 PROCEDURE — 73522 X-RAY EXAM HIPS BI 3-4 VIEWS: CPT | Mod: 26

## 2018-10-28 PROCEDURE — 12345: CPT | Mod: NC

## 2018-10-28 PROCEDURE — 74019 RADEX ABDOMEN 2 VIEWS: CPT | Mod: 26

## 2018-10-28 RX ORDER — POLYETHYLENE GLYCOL 3350 17 G/17G
17 POWDER, FOR SOLUTION ORAL
Qty: 0 | Refills: 0 | Status: DISCONTINUED | OUTPATIENT
Start: 2018-10-28 | End: 2018-11-02

## 2018-10-28 RX ORDER — DOCUSATE SODIUM 100 MG
100 CAPSULE ORAL THREE TIMES A DAY
Qty: 0 | Refills: 0 | Status: DISCONTINUED | OUTPATIENT
Start: 2018-10-28 | End: 2018-11-02

## 2018-10-28 RX ORDER — SENNA PLUS 8.6 MG/1
2 TABLET ORAL AT BEDTIME
Qty: 0 | Refills: 0 | Status: DISCONTINUED | OUTPATIENT
Start: 2018-10-28 | End: 2018-11-02

## 2018-10-28 RX ORDER — WARFARIN SODIUM 2.5 MG/1
2.5 TABLET ORAL ONCE
Qty: 0 | Refills: 0 | Status: COMPLETED | OUTPATIENT
Start: 2018-10-28 | End: 2018-10-28

## 2018-10-28 RX ORDER — MAGNESIUM HYDROXIDE 400 MG/1
30 TABLET, CHEWABLE ORAL DAILY
Qty: 0 | Refills: 0 | Status: DISCONTINUED | OUTPATIENT
Start: 2018-10-28 | End: 2018-11-02

## 2018-10-28 RX ADMIN — WARFARIN SODIUM 2.5 MILLIGRAM(S): 2.5 TABLET ORAL at 20:59

## 2018-10-28 RX ADMIN — Medication 100 MILLIGRAM(S): at 21:00

## 2018-10-28 RX ADMIN — LIDOCAINE 1 PATCH: 4 CREAM TOPICAL at 11:48

## 2018-10-28 RX ADMIN — Medication 5 MILLIGRAM(S): at 16:36

## 2018-10-28 RX ADMIN — Medication 50 MILLIGRAM(S): at 06:04

## 2018-10-28 RX ADMIN — ATORVASTATIN CALCIUM 20 MILLIGRAM(S): 80 TABLET, FILM COATED ORAL at 21:00

## 2018-10-28 RX ADMIN — TRAMADOL HYDROCHLORIDE 50 MILLIGRAM(S): 50 TABLET ORAL at 09:15

## 2018-10-28 RX ADMIN — LISINOPRIL 10 MILLIGRAM(S): 2.5 TABLET ORAL at 06:04

## 2018-10-28 RX ADMIN — LIDOCAINE 1 PATCH: 4 CREAM TOPICAL at 23:17

## 2018-10-28 RX ADMIN — TRAMADOL HYDROCHLORIDE 50 MILLIGRAM(S): 50 TABLET ORAL at 02:50

## 2018-10-28 RX ADMIN — Medication 25 MILLIGRAM(S): at 20:59

## 2018-10-28 RX ADMIN — Medication 5 MILLIGRAM(S): at 02:17

## 2018-10-28 RX ADMIN — TRAMADOL HYDROCHLORIDE 50 MILLIGRAM(S): 50 TABLET ORAL at 08:45

## 2018-10-28 RX ADMIN — Medication 100 MILLIGRAM(S): at 13:07

## 2018-10-28 RX ADMIN — LIDOCAINE 1 PATCH: 4 CREAM TOPICAL at 04:20

## 2018-10-28 RX ADMIN — TRAMADOL HYDROCHLORIDE 50 MILLIGRAM(S): 50 TABLET ORAL at 02:17

## 2018-10-28 RX ADMIN — SENNA PLUS 2 TABLET(S): 8.6 TABLET ORAL at 21:00

## 2018-10-28 RX ADMIN — POLYETHYLENE GLYCOL 3350 17 GRAM(S): 17 POWDER, FOR SOLUTION ORAL at 17:38

## 2018-10-29 ENCOUNTER — OUTPATIENT (OUTPATIENT)
Dept: OUTPATIENT SERVICES | Facility: HOSPITAL | Age: 83
LOS: 1 days | End: 2018-10-29
Payer: COMMERCIAL

## 2018-10-29 DIAGNOSIS — M54.5 LOW BACK PAIN: ICD-10-CM

## 2018-10-29 PROBLEM — M48.061 SPINAL STENOSIS, LUMBAR REGION WITHOUT NEUROGENIC CLAUDICATION: Chronic | Status: ACTIVE | Noted: 2018-10-25

## 2018-10-29 PROBLEM — M51.26 OTHER INTERVERTEBRAL DISC DISPLACEMENT, LUMBAR REGION: Chronic | Status: ACTIVE | Noted: 2018-10-25

## 2018-10-29 LAB
ANION GAP SERPL CALC-SCNC: 9 MMOL/L — SIGNIFICANT CHANGE UP (ref 5–17)
ANION GAP SERPL CALC-SCNC: 9 MMOL/L — SIGNIFICANT CHANGE UP (ref 5–17)
BUN SERPL-MCNC: 42 MG/DL — HIGH (ref 7–23)
BUN SERPL-MCNC: 51 MG/DL — HIGH (ref 7–23)
CALCIUM SERPL-MCNC: 9.4 MG/DL — SIGNIFICANT CHANGE UP (ref 8.4–10.5)
CALCIUM SERPL-MCNC: 9.8 MG/DL — SIGNIFICANT CHANGE UP (ref 8.4–10.5)
CHLORIDE SERPL-SCNC: 99 MMOL/L — SIGNIFICANT CHANGE UP (ref 96–108)
CHLORIDE SERPL-SCNC: 99 MMOL/L — SIGNIFICANT CHANGE UP (ref 96–108)
CO2 SERPL-SCNC: 29 MMOL/L — SIGNIFICANT CHANGE UP (ref 22–31)
CO2 SERPL-SCNC: 30 MMOL/L — SIGNIFICANT CHANGE UP (ref 22–31)
CREAT SERPL-MCNC: 1.28 MG/DL — SIGNIFICANT CHANGE UP (ref 0.5–1.3)
CREAT SERPL-MCNC: 1.41 MG/DL — HIGH (ref 0.5–1.3)
GLUCOSE BLDC GLUCOMTR-MCNC: 100 MG/DL — HIGH (ref 70–99)
GLUCOSE SERPL-MCNC: 115 MG/DL — HIGH (ref 70–99)
GLUCOSE SERPL-MCNC: 125 MG/DL — HIGH (ref 70–99)
HCT VFR BLD CALC: 35.3 % — SIGNIFICANT CHANGE UP (ref 34.5–45)
HGB BLD-MCNC: 12 G/DL — SIGNIFICANT CHANGE UP (ref 11.5–15.5)
INR BLD: 2.16 RATIO — HIGH (ref 0.88–1.16)
MCHC RBC-ENTMCNC: 30.2 PG — SIGNIFICANT CHANGE UP (ref 27–34)
MCHC RBC-ENTMCNC: 34 GM/DL — SIGNIFICANT CHANGE UP (ref 32–36)
MCV RBC AUTO: 88.7 FL — SIGNIFICANT CHANGE UP (ref 80–100)
NRBC # BLD: 0 /100 WBCS — SIGNIFICANT CHANGE UP (ref 0–0)
PLATELET # BLD AUTO: 147 K/UL — LOW (ref 150–400)
POTASSIUM SERPL-MCNC: 3.7 MMOL/L — SIGNIFICANT CHANGE UP (ref 3.5–5.3)
POTASSIUM SERPL-MCNC: 3.9 MMOL/L — SIGNIFICANT CHANGE UP (ref 3.5–5.3)
POTASSIUM SERPL-SCNC: 3.7 MMOL/L — SIGNIFICANT CHANGE UP (ref 3.5–5.3)
POTASSIUM SERPL-SCNC: 3.9 MMOL/L — SIGNIFICANT CHANGE UP (ref 3.5–5.3)
PROTHROM AB SERPL-ACNC: 23.9 SEC — HIGH (ref 9.8–12.7)
RBC # BLD: 3.98 M/UL — SIGNIFICANT CHANGE UP (ref 3.8–5.2)
RBC # FLD: 13.4 % — SIGNIFICANT CHANGE UP (ref 10.3–14.5)
SODIUM SERPL-SCNC: 137 MMOL/L — SIGNIFICANT CHANGE UP (ref 135–145)
SODIUM SERPL-SCNC: 138 MMOL/L — SIGNIFICANT CHANGE UP (ref 135–145)
TROPONIN I SERPL-MCNC: 0 NG/ML — LOW (ref 0.02–0.06)
WBC # BLD: 10.46 K/UL — SIGNIFICANT CHANGE UP (ref 3.8–10.5)
WBC # FLD AUTO: 10.46 K/UL — SIGNIFICANT CHANGE UP (ref 3.8–10.5)

## 2018-10-29 PROCEDURE — 72148 MRI LUMBAR SPINE W/O DYE: CPT

## 2018-10-29 PROCEDURE — 99291 CRITICAL CARE FIRST HOUR: CPT

## 2018-10-29 PROCEDURE — 93010 ELECTROCARDIOGRAM REPORT: CPT | Mod: 76

## 2018-10-29 PROCEDURE — 72148 MRI LUMBAR SPINE W/O DYE: CPT | Mod: 26

## 2018-10-29 RX ORDER — SODIUM CHLORIDE 9 MG/ML
1000 INJECTION INTRAMUSCULAR; INTRAVENOUS; SUBCUTANEOUS
Qty: 0 | Refills: 0 | Status: DISCONTINUED | OUTPATIENT
Start: 2018-10-29 | End: 2018-10-30

## 2018-10-29 RX ORDER — AMIODARONE HYDROCHLORIDE 400 MG/1
100 TABLET ORAL ONCE
Qty: 0 | Refills: 0 | Status: COMPLETED | OUTPATIENT
Start: 2018-10-29 | End: 2018-10-29

## 2018-10-29 RX ORDER — WARFARIN SODIUM 2.5 MG/1
2.5 TABLET ORAL ONCE
Qty: 0 | Refills: 0 | Status: COMPLETED | OUTPATIENT
Start: 2018-10-29 | End: 2018-10-29

## 2018-10-29 RX ORDER — OXYCODONE AND ACETAMINOPHEN 5; 325 MG/1; MG/1
1 TABLET ORAL ONCE
Qty: 0 | Refills: 0 | Status: DISCONTINUED | OUTPATIENT
Start: 2018-10-29 | End: 2018-10-29

## 2018-10-29 RX ORDER — METOPROLOL TARTRATE 50 MG
5 TABLET ORAL ONCE
Qty: 0 | Refills: 0 | Status: COMPLETED | OUTPATIENT
Start: 2018-10-29 | End: 2018-10-29

## 2018-10-29 RX ORDER — DILTIAZEM HCL 120 MG
5 CAPSULE, EXT RELEASE 24 HR ORAL
Qty: 125 | Refills: 0 | Status: DISCONTINUED | OUTPATIENT
Start: 2018-10-29 | End: 2018-10-31

## 2018-10-29 RX ADMIN — AMIODARONE HYDROCHLORIDE 100 MILLIGRAM(S): 400 TABLET ORAL at 20:17

## 2018-10-29 RX ADMIN — LIDOCAINE 1 PATCH: 4 CREAM TOPICAL at 18:20

## 2018-10-29 RX ADMIN — Medication 1 MILLIGRAM(S): at 20:05

## 2018-10-29 RX ADMIN — POLYETHYLENE GLYCOL 3350 17 GRAM(S): 17 POWDER, FOR SOLUTION ORAL at 05:38

## 2018-10-29 RX ADMIN — Medication 5 MG/HR: at 21:45

## 2018-10-29 RX ADMIN — WARFARIN SODIUM 2.5 MILLIGRAM(S): 2.5 TABLET ORAL at 21:48

## 2018-10-29 RX ADMIN — AMIODARONE HYDROCHLORIDE 100 MILLIGRAM(S): 400 TABLET ORAL at 08:09

## 2018-10-29 RX ADMIN — Medication 100 MILLIGRAM(S): at 13:14

## 2018-10-29 RX ADMIN — Medication 5 MILLIGRAM(S): at 06:52

## 2018-10-29 RX ADMIN — Medication 650 MILLIGRAM(S): at 10:55

## 2018-10-29 RX ADMIN — LISINOPRIL 10 MILLIGRAM(S): 2.5 TABLET ORAL at 05:38

## 2018-10-29 RX ADMIN — Medication 50 MILLIGRAM(S): at 18:08

## 2018-10-29 RX ADMIN — Medication 650 MILLIGRAM(S): at 10:27

## 2018-10-29 RX ADMIN — ATORVASTATIN CALCIUM 20 MILLIGRAM(S): 80 TABLET, FILM COATED ORAL at 21:47

## 2018-10-29 RX ADMIN — SODIUM CHLORIDE 75 MILLILITER(S): 9 INJECTION INTRAMUSCULAR; INTRAVENOUS; SUBCUTANEOUS at 21:49

## 2018-10-29 RX ADMIN — LIDOCAINE 1 PATCH: 4 CREAM TOPICAL at 21:40

## 2018-10-29 RX ADMIN — LIDOCAINE 1 PATCH: 4 CREAM TOPICAL at 00:17

## 2018-10-29 RX ADMIN — Medication 100 MILLIGRAM(S): at 05:38

## 2018-10-29 RX ADMIN — LIDOCAINE 1 PATCH: 4 CREAM TOPICAL at 10:16

## 2018-10-29 RX ADMIN — TRAMADOL HYDROCHLORIDE 50 MILLIGRAM(S): 50 TABLET ORAL at 06:52

## 2018-10-29 RX ADMIN — Medication 5 MILLIGRAM(S): at 20:00

## 2018-10-29 NOTE — CHART NOTE - NSCHARTNOTEFT_GEN_A_CORE
Assessment:     Factors impacting intake: [ ] none [ ] nausea  [ ] vomiting [ ] diarrhea [ ] constipation  [ ]chewing problems [ ] swallowing issues  [ ] other:     Diet Presciption: Diet, DASH/TLC:   Sodium & Cholesterol Restricted (10-25-18 @ 22:47)    Intake:     Current Weight: Weight (kg): 72.6 (10-25 @ 15:06)  % Weight Change   no updated body weight to assess    Pertinent Medications: MEDICATIONS  (STANDING):  amiodarone    Tablet 100 milliGRAM(s) Oral <User Schedule>  atorvastatin 20 milliGRAM(s) Oral at bedtime  docusate sodium 100 milliGRAM(s) Oral three times a day  hydrochlorothiazide 50 milliGRAM(s) Oral daily  lidocaine   Patch 1 Patch Transdermal daily  lisinopril 10 milliGRAM(s) Oral daily  metoprolol succinate ER 25 milliGRAM(s) Oral daily  polyethylene glycol 3350 17 Gram(s) Oral two times a day  senna 2 Tablet(s) Oral at bedtime    MEDICATIONS  (PRN):  acetaminophen   Tablet .. 650 milliGRAM(s) Oral every 6 hours PRN Temp greater or equal to 38.5C (101.3F), Mild Pain (1 - 3)  diazepam    Tablet 5 milliGRAM(s) Oral every 12 hours PRN muscle spasm  magnesium hydroxide Suspension 30 milliLiter(s) Oral daily PRN Constipation  traMADol 50 milliGRAM(s) Oral every 6 hours PRN Moderate Pain (4 - 6)    Pertinent Labs: 10-29 Na137 mmol/L Glu 125 mg/dL<H> K+ 3.9 mmol/L Cr  1.28 mg/dL BUN 42 mg/dL<H> 10-26 Phos 3.4 mg/dL 10-26 Alb 3.7 g/dL 10-26 AukpsjvtraP2F 5.3 % 10-26 Chol 141 mg/dL LDL 68 mg/dL HDL 61 mg/dL Trig 60 mg/dL     CAPILLARY BLOOD GLUCOSE        Skin: intact    Estimated Needs:   [x ] no change since previous assessment  [ ] recalculated:     Previous Nutrition Diagnosis:   [ ] Inadequate Energy Intake [ ]Inadequate Oral Intake [ ] Excessive Energy Intake   [ ] Underweight [ ] Increased Nutrient Needs [ ] Overweight/Obesity   [ ] Altered GI Function [ ] Unintended Weight Loss [ ] Food & Nutrition Related Knowledge Deficit [ ] Malnutrition [x ] Biochemical Food Drug Interaction     Nutrition Diagnosis is [x ] ongoing  [ ] resolved [ ] not applicable     New Nutrition Diagnosis: [x ] not applicable       Interventions: continue DASH/TLC Vitamin K modified diet  Recommend  [ ] Change Diet To:  [ ] Nutrition Supplement  [ ] Nutrition Support  [ ] Other:     Monitoring and Evaluation:   [x ] PO intake [ x ] Tolerance to diet prescription [ x ] weights [ x ] labs[ x ] follow up per protocol  [ ] other: Assessment: Pt continues on DASH/TLC, vit k modified diet c variable intake (back pain contributing to decrease po at times).  S/p 2x BM today.  Pt makes her nutrition related needs known.  Meal changes obtained daily to optimize po intake.    Factors impacting intake: [ ] none [ ] nausea  [ ] vomiting [ ] diarrhea [ ] constipation  [ ]chewing problems [ ] swallowing issues  [x ] other: back pain    Diet Presciption: Diet, DASH/TLC:   Sodium & Cholesterol Restricted (10-25-18 @ 22:47)    Intake:     Current Weight: Weight (kg): 72.6 (10-25 @ 15:06)  % Weight Change   no updated body weight to assess    Pertinent Medications: MEDICATIONS  (STANDING):  amiodarone    Tablet 100 milliGRAM(s) Oral <User Schedule>  atorvastatin 20 milliGRAM(s) Oral at bedtime  docusate sodium 100 milliGRAM(s) Oral three times a day  hydrochlorothiazide 50 milliGRAM(s) Oral daily  lidocaine   Patch 1 Patch Transdermal daily  lisinopril 10 milliGRAM(s) Oral daily  metoprolol succinate ER 25 milliGRAM(s) Oral daily  polyethylene glycol 3350 17 Gram(s) Oral two times a day  senna 2 Tablet(s) Oral at bedtime    MEDICATIONS  (PRN):  acetaminophen   Tablet .. 650 milliGRAM(s) Oral every 6 hours PRN Temp greater or equal to 38.5C (101.3F), Mild Pain (1 - 3)  diazepam    Tablet 5 milliGRAM(s) Oral every 12 hours PRN muscle spasm  magnesium hydroxide Suspension 30 milliLiter(s) Oral daily PRN Constipation  traMADol 50 milliGRAM(s) Oral every 6 hours PRN Moderate Pain (4 - 6)    Pertinent Labs: 10-29 Na137 mmol/L Glu 125 mg/dL<H> K+ 3.9 mmol/L Cr  1.28 mg/dL BUN 42 mg/dL<H> 10-26 Phos 3.4 mg/dL 10-26 Alb 3.7 g/dL 10-26 TgsillprqoE4U 5.3 % 10-26 Chol 141 mg/dL LDL 68 mg/dL HDL 61 mg/dL Trig 60 mg/dL     CAPILLARY BLOOD GLUCOSE        Skin: intact    Estimated Needs:   [x ] no change since previous assessment  [ ] recalculated:     Previous Nutrition Diagnosis:   [ ] Inadequate Energy Intake [ ]Inadequate Oral Intake [ ] Excessive Energy Intake   [ ] Underweight [ ] Increased Nutrient Needs [ ] Overweight/Obesity   [ ] Altered GI Function [ ] Unintended Weight Loss [ ] Food & Nutrition Related Knowledge Deficit [ ] Malnutrition [x ] Biochemical Food Drug Interaction     Nutrition Diagnosis is [x ] ongoing  [ ] resolved [ ] not applicable     New Nutrition Diagnosis: [x ] not applicable       Interventions: continue DASH/TLC Vitamin K modified diet  Recommend  [ ] Change Diet To:  [ ] Nutrition Supplement  [ ] Nutrition Support  [ ] Other:     Monitoring and Evaluation:   [x ] PO intake [ x ] Tolerance to diet prescription [ x ] weights [ x ] labs[ x ] follow up per protocol  [ ] other:

## 2018-10-29 NOTE — CHART NOTE - NSCHARTNOTEFT_GEN_A_CORE
Called for chest pain, rapid response was activated at the same time, seen & examined at the bedside, AAO X3, reporting palpitations with severe retrosternal chest pain radiating to her left arm, no SOB, dizziness, diaphoresis, nausea, or vomiting. Stat 12 leads EKG showed A. Fib with RVR in the 150's with marked diffuse ST depression. SPO2 93% on RA, /88, patient was emotional, crying, no JVD, no lower extremity edema, Heart: Variable S1, Prosthetic S2, no murmurs or extra sounds, Lungs: Good air entry B/L, no rales, rhonchi, or wheezing. Abdomen: Soft, non-tender, non-distended; bowel sounds present, no palpable masses or organomegaly. Received Metoprolol 5 mg IVP X1 with fair response, in addition to Ativan 1 mg IVP X1 dose. Patient keeps alternating between SR & A.Fib with RVR back & forth, gave Amiodarone 100 mg IVP X1 dose (patient is on Amiodarone PO), then Cardizem 20 mg IVP bolus followed by Diltiazem drip, oxygen via NC, Troponin & BMP stat, transferred to [facility] under hospitalist service for further management. Discussed with her son who is a cardiologist & with her cardiologist (her son's partner) on phone, they are in agreement with the plan. Called for chest pain, rapid response was activated at the same time, seen & examined at the bedside, AAO X3, reporting palpitations with severe retrosternal chest pain radiating to her left arm, no SOB, dizziness, diaphoresis, nausea, or vomiting. Stat 12 leads EKG showed A. Fib with RVR in the 150's with marked diffuse ST depression. SPO2 93% on RA, /88, patient was emotional, crying, no JVD, no lower extremity edema, Heart: Variable S1, Prosthetic S2, no murmurs or extra sounds, Lungs: Good air entry B/L, no rales, rhonchi, or wheezing. Abdomen: Soft, non-tender, non-distended; bowel sounds present, no palpable masses or organomegaly. Received Metoprolol 5 mg IVP X1 with fair response, in addition to Ativan 1 mg IVP X1 dose. Patient keeps alternating between SR & A.Fib with RVR back & forth, gave Amiodarone 100 mg IVP X1 dose (patient is on Amiodarone PO), then Cardizem 20 mg IVP bolus followed by Diltiazem drip, oxygen via NC, Troponin & BMP stat, transferred to SPCU under hospitalist service for further management. Discussed with her son who is a cardiologist & with her cardiologist (her son's partner) on phone, they are in agreement with the plan. Critical care time 50 minutes.

## 2018-10-30 DIAGNOSIS — C50.919 MALIGNANT NEOPLASM OF UNSPECIFIED SITE OF UNSPECIFIED FEMALE BREAST: ICD-10-CM

## 2018-10-30 DIAGNOSIS — I38 ENDOCARDITIS, VALVE UNSPECIFIED: ICD-10-CM

## 2018-10-30 DIAGNOSIS — I10 ESSENTIAL (PRIMARY) HYPERTENSION: ICD-10-CM

## 2018-10-30 DIAGNOSIS — S32.020A WEDGE COMPRESSION FRACTURE OF SECOND LUMBAR VERTEBRA, INITIAL ENCOUNTER FOR CLOSED FRACTURE: ICD-10-CM

## 2018-10-30 DIAGNOSIS — I95.9 HYPOTENSION, UNSPECIFIED: ICD-10-CM

## 2018-10-30 DIAGNOSIS — I35.0 NONRHEUMATIC AORTIC (VALVE) STENOSIS: ICD-10-CM

## 2018-10-30 LAB
24R-OH-CALCIDIOL SERPL-MCNC: 55.6 NG/ML — SIGNIFICANT CHANGE UP (ref 30–80)
ANION GAP SERPL CALC-SCNC: 6 MMOL/L — SIGNIFICANT CHANGE UP (ref 5–17)
BUN SERPL-MCNC: 44 MG/DL — HIGH (ref 7–23)
CALCIUM SERPL-MCNC: 9.1 MG/DL — SIGNIFICANT CHANGE UP (ref 8.4–10.5)
CHLORIDE SERPL-SCNC: 103 MMOL/L — SIGNIFICANT CHANGE UP (ref 96–108)
CO2 SERPL-SCNC: 30 MMOL/L — SIGNIFICANT CHANGE UP (ref 22–31)
CREAT SERPL-MCNC: 1.24 MG/DL — SIGNIFICANT CHANGE UP (ref 0.5–1.3)
GLUCOSE SERPL-MCNC: 105 MG/DL — HIGH (ref 70–99)
HCT VFR BLD CALC: 35.2 % — SIGNIFICANT CHANGE UP (ref 34.5–45)
HGB BLD-MCNC: 11.8 G/DL — SIGNIFICANT CHANGE UP (ref 11.5–15.5)
INR BLD: 1.85 RATIO — HIGH (ref 0.88–1.16)
MCHC RBC-ENTMCNC: 30.6 PG — SIGNIFICANT CHANGE UP (ref 27–34)
MCHC RBC-ENTMCNC: 33.5 GM/DL — SIGNIFICANT CHANGE UP (ref 32–36)
MCV RBC AUTO: 91.2 FL — SIGNIFICANT CHANGE UP (ref 80–100)
NRBC # BLD: 0 /100 WBCS — SIGNIFICANT CHANGE UP (ref 0–0)
PLATELET # BLD AUTO: 145 K/UL — LOW (ref 150–400)
POTASSIUM SERPL-MCNC: 3.6 MMOL/L — SIGNIFICANT CHANGE UP (ref 3.5–5.3)
POTASSIUM SERPL-SCNC: 3.6 MMOL/L — SIGNIFICANT CHANGE UP (ref 3.5–5.3)
PROTHROM AB SERPL-ACNC: 20.4 SEC — HIGH (ref 9.8–12.7)
RBC # BLD: 3.86 M/UL — SIGNIFICANT CHANGE UP (ref 3.8–5.2)
RBC # FLD: 13.5 % — SIGNIFICANT CHANGE UP (ref 10.3–14.5)
SODIUM SERPL-SCNC: 139 MMOL/L — SIGNIFICANT CHANGE UP (ref 135–145)
TROPONIN I SERPL-MCNC: 0 NG/ML — LOW (ref 0.02–0.06)
WBC # BLD: 7.47 K/UL — SIGNIFICANT CHANGE UP (ref 3.8–10.5)
WBC # FLD AUTO: 7.47 K/UL — SIGNIFICANT CHANGE UP (ref 3.8–10.5)

## 2018-10-30 PROCEDURE — 93010 ELECTROCARDIOGRAM REPORT: CPT

## 2018-10-30 PROCEDURE — 99233 SBSQ HOSP IP/OBS HIGH 50: CPT

## 2018-10-30 PROCEDURE — 99222 1ST HOSP IP/OBS MODERATE 55: CPT

## 2018-10-30 RX ORDER — WARFARIN SODIUM 2.5 MG/1
2.5 TABLET ORAL ONCE
Qty: 0 | Refills: 0 | Status: DISCONTINUED | OUTPATIENT
Start: 2018-10-30 | End: 2018-10-30

## 2018-10-30 RX ORDER — POTASSIUM CHLORIDE 20 MEQ
40 PACKET (EA) ORAL ONCE
Qty: 0 | Refills: 0 | Status: COMPLETED | OUTPATIENT
Start: 2018-10-30 | End: 2018-10-30

## 2018-10-30 RX ORDER — WARFARIN SODIUM 2.5 MG/1
5 TABLET ORAL ONCE
Qty: 0 | Refills: 0 | Status: COMPLETED | OUTPATIENT
Start: 2018-10-30 | End: 2018-10-30

## 2018-10-30 RX ADMIN — ATORVASTATIN CALCIUM 20 MILLIGRAM(S): 80 TABLET, FILM COATED ORAL at 21:19

## 2018-10-30 RX ADMIN — LISINOPRIL 10 MILLIGRAM(S): 2.5 TABLET ORAL at 06:07

## 2018-10-30 RX ADMIN — Medication 40 MILLIEQUIVALENT(S): at 16:51

## 2018-10-30 RX ADMIN — LIDOCAINE 1 PATCH: 4 CREAM TOPICAL at 23:21

## 2018-10-30 RX ADMIN — WARFARIN SODIUM 5 MILLIGRAM(S): 2.5 TABLET ORAL at 21:22

## 2018-10-30 RX ADMIN — Medication 650 MILLIGRAM(S): at 11:23

## 2018-10-30 RX ADMIN — Medication 650 MILLIGRAM(S): at 12:23

## 2018-10-30 RX ADMIN — LIDOCAINE 1 PATCH: 4 CREAM TOPICAL at 18:58

## 2018-10-30 RX ADMIN — Medication 5 MILLIGRAM(S): at 11:22

## 2018-10-30 RX ADMIN — TRAMADOL HYDROCHLORIDE 50 MILLIGRAM(S): 50 TABLET ORAL at 11:20

## 2018-10-30 RX ADMIN — TRAMADOL HYDROCHLORIDE 50 MILLIGRAM(S): 50 TABLET ORAL at 23:40

## 2018-10-30 RX ADMIN — LIDOCAINE 1 PATCH: 4 CREAM TOPICAL at 11:44

## 2018-10-30 RX ADMIN — SODIUM CHLORIDE 75 MILLILITER(S): 9 INJECTION INTRAMUSCULAR; INTRAVENOUS; SUBCUTANEOUS at 11:43

## 2018-10-30 RX ADMIN — TRAMADOL HYDROCHLORIDE 50 MILLIGRAM(S): 50 TABLET ORAL at 12:49

## 2018-10-30 RX ADMIN — Medication 5 MILLIGRAM(S): at 23:40

## 2018-10-30 NOTE — CONSULT NOTE ADULT - PROBLEM SELECTOR RECOMMENDATION 9
control rate  Cardio eval  AC
s/p acute event , now in sinus rhythm , negative troponin ,  prior hx of PAF on chronic anticoagulation , continue her amiodarone , metoprolol , warfarin

## 2018-10-30 NOTE — CONSULT NOTE ADULT - PROBLEM SELECTOR RECOMMENDATION 2
controlled , continue medication. would decrease use of HCTZ as patient has tendency to have hypokalemia , pre renal azotemia ,
pain control  PT

## 2018-10-30 NOTE — CONSULT NOTE ADULT - ASSESSMENT
Pt admitted for compression fx lumbar spine, developed paroxysmal atrial fib and hypotension. Now NSR. BP borderline, probably dehydrated.

## 2018-10-30 NOTE — CONSULT NOTE ADULT - SUBJECTIVE AND OBJECTIVE BOX
PULMONARY/CRITICAL CARE        Patient is a 83y old  Female who presents with a chief complaint of Fall and back pain (29 Oct 2018 22:03)    BRIEF HOSPITAL COURSE: ***  · Subjective and Objective:     83F with PMHx of Lumbar herniated disc, lumbar spinal stenosis, hld, htn, paroxysmal afib, severe aortic stenosis, left breast ca s/p lumpectomy, s/p bilateral mastectomy, s/p right shoulder repair, s/p hysterectomy, initially admitted s/p fall with resultant back pain and compression fractures. Pt upgraded to ICU post Rapid Response for afib with RVR, HTN Urgency and Chest Pain. Pt treated with IV lopressor/Cardizem with transient improvement to HR and then converting back into rapid afib. Pt started on Cardizem gtt and transferred to ICU for further management. Pt seen and examined during Rapid Response and again upon arrival to ICU. Pt's Son (Dr. Aba Leger)   Pt now in NSR, no sob, cp, dizziness.  Events last 24 hours: ***    PAST MEDICAL & SURGICAL HISTORY:  Lumbar herniated disc  Spinal stenosis of lumbar region  Hyperlipidemia  Hypertension  Paroxysmal a-fib  Aortic stenosis: severe, had AVR 2016  Breast cancer: left breast  Obesity (BMI 30-39.9)  AF (paroxysmal atrial fibrillation): x 2 cardioversions - 9/2008, 2009 - chemical  S/P shoulder surgery: right humerus fracture repair 1999  S/P hysterectomy: 2003  S/P mastectomy, bilateral: january 2002  S/P lumpectomy of breast: left breast - with radiation 1990    Allergies    caffeine (Other)  penicillin (Angioedema; Swelling)    Intolerances      FAMILY HISTORY:  No pertinent family history in first degree relatives      Review of Systems:  CONSTITUTIONAL: No fever, chills, or fatigue  EYES: No eye pain, visual disturbances, or discharge  ENMT:  No difficulty hearing, tinnitus, vertigo; No sinus or throat pain  NECK: No pain or stiffness  RESPIRATORY: No cough, wheezing, chills or hemoptysis; No shortness of breath  CARDIOVASCULAR: No chest pain, palpitations, dizziness, or leg swelling  GASTROINTESTINAL: No abdominal or epigastric pain. No nausea, vomiting, or hematemesis; No diarrhea or constipation. No melena or hematochezia.  GENITOURINARY: No dysuria, frequency, hematuria, or incontinence  NEUROLOGICAL: No headaches, memory loss, loss of strength, numbness, or tremors  SKIN: No itching, burning, rashes, or lesions   MUSCULOSKELETAL: No joint pain or swelling; has pain back, PSYCHIATRIC: No depression, anxiety, mood swings, or difficulty sleeping      Medications:    amiodarone    Tablet 100 milliGRAM(s) Oral <User Schedule>  diltiazem Infusion 5 mG/Hr IV Continuous <Continuous>  hydrochlorothiazide 50 milliGRAM(s) Oral daily  lisinopril 10 milliGRAM(s) Oral daily  metoprolol succinate ER 25 milliGRAM(s) Oral daily      acetaminophen   Tablet .. 650 milliGRAM(s) Oral every 6 hours PRN  diazepam    Tablet 5 milliGRAM(s) Oral every 12 hours PRN  traMADol 50 milliGRAM(s) Oral every 6 hours PRN        docusate sodium 100 milliGRAM(s) Oral three times a day  magnesium hydroxide Suspension 30 milliLiter(s) Oral daily PRN  polyethylene glycol 3350 17 Gram(s) Oral two times a day  senna 2 Tablet(s) Oral at bedtime      atorvastatin 20 milliGRAM(s) Oral at bedtime    sodium chloride 0.9%. 1000 milliLiter(s) IV Continuous <Continuous>      lidocaine   Patch 1 Patch Transdermal daily            ICU Vital Signs Last 24 Hrs  T(C): 36.8 (30 Oct 2018 04:30), Max: 37.2 (29 Oct 2018 19:50)  T(F): 98.2 (30 Oct 2018 04:30), Max: 98.9 (29 Oct 2018 19:50)  HR: 60 (30 Oct 2018 06:00) (60 - 153)  BP: 102/43 (30 Oct 2018 06:00) (85/48 - 240/110)  BP(mean): 60 (30 Oct 2018 06:00) (60 - 73)  ABP: --  ABP(mean): --  RR: 17 (30 Oct 2018 06:00) (14 - 31)  SpO2: 97% (30 Oct 2018 06:00) (92% - 100%)    Vital Signs Last 24 Hrs  T(C): 36.8 (30 Oct 2018 04:30), Max: 37.2 (29 Oct 2018 19:50)  T(F): 98.2 (30 Oct 2018 04:30), Max: 98.9 (29 Oct 2018 19:50)  HR: 60 (30 Oct 2018 06:00) (60 - 153)  BP: 102/43 (30 Oct 2018 06:00) (85/48 - 240/110)  BP(mean): 60 (30 Oct 2018 06:00) (60 - 73)  RR: 17 (30 Oct 2018 06:00) (14 - 31)  SpO2: 97% (30 Oct 2018 06:00) (92% - 100%)        I&O's Detail    28 Oct 2018 07:01  -  29 Oct 2018 07:00  --------------------------------------------------------  IN:    Oral Fluid: 240 mL  Total IN: 240 mL    OUT:  Total OUT: 0 mL    Total NET: 240 mL      29 Oct 2018 07:01  -  30 Oct 2018 06:45  --------------------------------------------------------  IN:    diltiazem Infusion: 10 mL    Oral Fluid: 350 mL    sodium chloride 0.9%.: 675 mL  Total IN: 1035 mL    OUT:  Total OUT: 0 mL    Total NET: 1035 mL            LABS:                        11.8   7.47  )-----------( 145      ( 30 Oct 2018 06:31 )             35.2     10-29    138  |  99  |  51<H>  ----------------------------<  115<H>  3.7   |  30  |  1.41<H>    Ca    9.8      29 Oct 2018 20:21        CARDIAC MARKERS ( 30 Oct 2018 01:57 )  .000 ng/mL / x     / x     / x     / x      CARDIAC MARKERS ( 29 Oct 2018 20:21 )  .000 ng/mL / x     / x     / x     / x          CAPILLARY BLOOD GLUCOSE      POCT Blood Glucose.: 100 mg/dL (29 Oct 2018 19:54)    PT/INR - ( 30 Oct 2018 06:31 )   PT: 20.4 sec;   INR: 1.85 ratio             CULTURES:      Physical Examination:    General: No acute distress.      HEENT: Pupils equal, reactive to light.  Symmetric.    PULM: Clear to auscultation bilaterally, no significant sputum production    CVS: Regular rate and rhythm, 3/6 systolic murmur, no  rubs, or gallops    ABD: Soft, nondistended, nontender, normoactive bowel sounds, no masses    EXT: No edema, nontender    SKIN: Warm and well perfused, no rashes noted.    NEURO: Alert, oriented, interactive, nonfocal    RADIOLOGY: ***< from: MR Lumbar Spine No Cont (10.29.18 @ 12:00) >  EXAM:  MR SPINE LUMBAR                            PROCEDURE DATE:  10/29/2018          INTERPRETATION:  History: Fall, low back pain    MRI lumbar spine direct sagittal axial imaging multiple pulse sequences.   No exogenous contrast.    Images are of diagnostic quality.  Preservation of normal lordosis. Mild acute L2 and L4 compression   deformities noted without significant retropulsion. There is associated   vertebral body marrow edema. At L2 there is a fluid-filled cleft   extending from theanterior to posterior vertebral body margin.  All discs are desiccated. Marked narrowing L4-L5 disc.   Individual levels as follows:  T12-L1: Unremarkable.  L1/L2: No significant disc bulge, canal or foraminal compromise.  L2/L3: Mild broad-based annular bulge without significant foraminal   compromise. Disc bulge combines with facet arthropathy ligamentum flavum   redundancy resulting in moderate to severe central canal stenosis.  L3/L4: Mild broad-based annular bulge resulting in moderate bilateral   foraminal stenosis. Moderate bilateral facet arthropathy and ligamentum   flavum redundancy combines with disc disease resulting in moderate   central canal stenosis.  L4-L5: Moderate broad-based annular bulge with superimposed posterior   annular tear. Secondary moderate to severe bilateral foraminal stenosis.   Marked bilateral facet arthropathy ligamentum flavum redundancy combines   with disc disease resulting in severe central canal stenosis.  L5/S1: No significant disc bulge canal or foraminal compromise. Moderate   bilateral facet arthropathy.  The conus terminates at L1.  Paraspinal soft tissues unremarkable.    Impression:    Mild acute L2 and L4 compression deformities without significant   retropulsion.  Multilevel degenerative change as described with secondary multilevel   canal and foraminal compromise.                GOLDY UMAÑA M.D., ATTENDING RADIOLOGIST  This document has been electronically signed. Oct 29 2018  1:51PM    < end of copied text >      CRITICAL CARE TIME SPENT: ***
CHIEF COMPLAINT: worsening of bacj pain     HPI:  83 years old female with past medical history of Atrial Fibrillation, Severe spinal stenosis, herniated disc in back, bio aortic  valve replacement, Old compression fracture of lumbar spine, who was admitted due to worsened back pain ,  called for cardiology consult as patient had severe episode of palpitations , with pounding chest discomfort , noted to be atrial fibrillation with rapid ventricular rate  Patient denied any dizziness or sob , Patient was started on cardizem drip , now converted to sinus rhythm            PAST MEDICAL & SURGICAL HISTORY:  Lumbar herniated disc  Spinal stenosis of lumbar region  Hyperlipidemia  Hypertension  Paroxysmal a-fib  Aortic stenosis: severe  Breast cancer: left breast  Obesity (BMI 30-39.9)  AF (paroxysmal atrial fibrillation): x 2 cardioversions - 9/2008, 2009 - chemical    AVR 2013  S/P shoulder surgery: right humerus fracture repair 1999  S/P hysterectomy: 2003  S/P mastectomy, bilateral: january 2002  S/P lumpectomy of breast: left breast - with radiation 1990      Allergies    caffeine (Other)  penicillin (Angioedema; Swelling)    Intolerances        SOCIAL HISTORY: non smoker     FAMILY HISTORY:  No pertinent family history in first degree relatives      MEDICATIONS:  MEDICATIONS  (STANDING):  amiodarone    Tablet 100 milliGRAM(s) Oral <User Schedule>  atorvastatin 20 milliGRAM(s) Oral at bedtime  diltiazem Infusion 5 mG/Hr (5 mL/Hr) IV Continuous <Continuous>  docusate sodium 100 milliGRAM(s) Oral three times a day  hydrochlorothiazide 50 milliGRAM(s) Oral daily  lidocaine   Patch 1 Patch Transdermal daily  lisinopril 10 milliGRAM(s) Oral daily  metoprolol succinate ER 25 milliGRAM(s) Oral daily  polyethylene glycol 3350 17 Gram(s) Oral two times a day  senna 2 Tablet(s) Oral at bedtime  sodium chloride 0.9%. 1000 milliLiter(s) (75 mL/Hr) IV Continuous <Continuous>    MEDICATIONS  (PRN):  acetaminophen   Tablet .. 650 milliGRAM(s) Oral every 6 hours PRN Temp greater or equal to 38.5C (101.3F), Mild Pain (1 - 3)  diazepam    Tablet 5 milliGRAM(s) Oral every 12 hours PRN muscle spasm  magnesium hydroxide Suspension 30 milliLiter(s) Oral daily PRN Constipation  traMADol 50 milliGRAM(s) Oral every 6 hours PRN Moderate Pain (4 - 6)      REVIEW OF SYSTEMS:    back pain     CONSTITUTIONAL: No weakness, fevers or chills  EYES/ENT: No visual changes;  No vertigo or throat pain   NECK: No pain or stiffness  RESPIRATORY: No cough, wheezing, hemoptysis; No shortness of breath  CARDIOVASCULAR: No chest pain or palpitations  GASTROINTESTINAL: No abdominal or epigastric pain. No nausea, vomiting, or hematemesis; No diarrhea or constipation. No melena or hematochezia.  GENITOURINARY: No dysuria, frequency or hematuria  NEUROLOGICAL: No numbness or weakness  SKIN: No itching, burning, rashes, or lesions   All other review of systems is negative unless indicated above    Vital Signs Last 24 Hrs  T(C): 36.4 (30 Oct 2018 08:11), Max: 37.2 (29 Oct 2018 19:50)  T(F): 97.6 (30 Oct 2018 08:11), Max: 98.9 (29 Oct 2018 19:50)  HR: 62 (30 Oct 2018 11:00) (57 - 153)  BP: 121/47 (30 Oct 2018 11:00) (85/48 - 240/110)  BP(mean): 70 (30 Oct 2018 11:00) (60 - 73)  RR: 17 (30 Oct 2018 11:00) (14 - 31)  SpO2: 98% (30 Oct 2018 11:00) (92% - 100%)    I&O's Summary    29 Oct 2018 07:01  -  30 Oct 2018 07:00  --------------------------------------------------------  IN: 1035 mL / OUT: 0 mL / NET: 1035 mL    30 Oct 2018 07:01  -  30 Oct 2018 11:44  --------------------------------------------------------  IN: 300 mL / OUT: 0 mL / NET: 300 mL        PHYSICAL EXAM:    Constitutional: NAD, awake and alert, well-developed  HEENT: PERR, EOMI,  No oral cyananosis.  Neck:  supple,  No JVD  Respiratory: Breath sounds are clear bilaterally, No wheezing, rales or rhonchi  Cardiovascular: S1 and S2, regular rate and rhythm, ESM  Gastrointestinal: Bowel Sounds present, soft, nontender.   Extremities: No peripheral edema. No clubbing or cyanosis.  Vascular: 2+ peripheral pulses  Neurological: A/O x 3, no focal deficits  Musculoskeletal: no calf tenderness.  back pain   Skin: No rashes.      LABS: All Labs Reviewed:                        11.8   7.47  )-----------( 145      ( 30 Oct 2018 06:31 )             35.2                         12.0   10.46 )-----------( 147      ( 29 Oct 2018 07:46 )             35.3     30 Oct 2018 06:31    139    |  103    |  44     ----------------------------<  105    3.6     |  30     |  1.24   29 Oct 2018 20:21    138    |  99     |  51     ----------------------------<  115    3.7     |  30     |  1.41   29 Oct 2018 07:46    137    |  99     |  42     ----------------------------<  125    3.9     |  29     |  1.28     Ca    9.1        30 Oct 2018 06:31  Ca    9.8        29 Oct 2018 20:21  Ca    9.4        29 Oct 2018 07:46      PT/INR - ( 30 Oct 2018 06:31 )   PT: 20.4 sec;   INR: 1.85 ratio           CARDIAC MARKERS ( 30 Oct 2018 01:57 )  .000 ng/mL / x     / x     / x     / x      CARDIAC MARKERS ( 29 Oct 2018 20:21 )  .000 ng/mL / x     / x     / x     / x          Blood Culture:         RADIOLOGY/EKG:  10/29/18 normal sinus rhythm non specific ST changes   10/30/18  sinus bradycardia  59 BPM     monitor strip afib with rapid rate converted to sinus rhythm     < from: Transthoracic Echocardiogram (09.17.18 @ 10:33) >  ----------------------------------------------------------------------  Conclusions:  1. Bioprosthetic aortic valve. Peak transaortic valve  gradient equals 25 mm Hg, mean transaortic valve gradient  equals 12 mm Hg, which is probably normal in the presence  of a bioprosthetic aortic valve. Mild aortic regurgitation.    2. Severely dilated left atrium.  LA volume index = 59  cc/m2.  3. Normal left ventricular internal dimensions and wall  thicknesses.  4. Normal left ventricular systolic function. No segmental  wall motion abnormalities.  5. Estimatedpulmonary artery systolic pressure equals 44  mm Hg, assuming right atrial pressure equals 8 mm Hg,  consistent with mild pulmonary pressures.  *** Compared with echocardiogram of 9/26/2017, no  significant changes noted.  ------------------------------------------------------------------------  Confirmed on  9/17/2018 - 15:27:55 by Bridger Pate M.D.  ------------------------------------------------------------------------    < end of copied text >

## 2018-10-30 NOTE — CONSULT NOTE ADULT - PROBLEM SELECTOR RECOMMENDATION 3
mild to moderate MR , bio AVR mild AI , mild pulmonary hypertension   normal EF continue monitoring periodically
cautious fluids

## 2018-10-31 LAB
ALBUMIN SERPL ELPH-MCNC: 3.1 G/DL — LOW (ref 3.3–5)
ALP SERPL-CCNC: 81 U/L — SIGNIFICANT CHANGE UP (ref 30–120)
ALT FLD-CCNC: 19 U/L DA — SIGNIFICANT CHANGE UP (ref 10–60)
ANION GAP SERPL CALC-SCNC: 8 MMOL/L — SIGNIFICANT CHANGE UP (ref 5–17)
APTT BLD: 35 SEC — SIGNIFICANT CHANGE UP (ref 28.5–37)
AST SERPL-CCNC: 13 U/L — SIGNIFICANT CHANGE UP (ref 10–40)
BILIRUB SERPL-MCNC: 0.6 MG/DL — SIGNIFICANT CHANGE UP (ref 0.2–1.2)
BUN SERPL-MCNC: 38 MG/DL — HIGH (ref 7–23)
CALCIUM SERPL-MCNC: 9.8 MG/DL — SIGNIFICANT CHANGE UP (ref 8.4–10.5)
CHLORIDE SERPL-SCNC: 101 MMOL/L — SIGNIFICANT CHANGE UP (ref 96–108)
CO2 SERPL-SCNC: 30 MMOL/L — SIGNIFICANT CHANGE UP (ref 22–31)
CREAT SERPL-MCNC: 1.2 MG/DL — SIGNIFICANT CHANGE UP (ref 0.5–1.3)
GLUCOSE SERPL-MCNC: 102 MG/DL — HIGH (ref 70–99)
HCT VFR BLD CALC: 37.7 % — SIGNIFICANT CHANGE UP (ref 34.5–45)
HGB BLD-MCNC: 12.6 G/DL — SIGNIFICANT CHANGE UP (ref 11.5–15.5)
INR BLD: 1.73 RATIO — HIGH (ref 0.88–1.16)
MAGNESIUM SERPL-MCNC: 2 MG/DL — SIGNIFICANT CHANGE UP (ref 1.6–2.6)
MCHC RBC-ENTMCNC: 30.2 PG — SIGNIFICANT CHANGE UP (ref 27–34)
MCHC RBC-ENTMCNC: 33.4 GM/DL — SIGNIFICANT CHANGE UP (ref 32–36)
MCV RBC AUTO: 90.4 FL — SIGNIFICANT CHANGE UP (ref 80–100)
NRBC # BLD: 0 /100 WBCS — SIGNIFICANT CHANGE UP (ref 0–0)
PLATELET # BLD AUTO: 190 K/UL — SIGNIFICANT CHANGE UP (ref 150–400)
POTASSIUM SERPL-MCNC: 4 MMOL/L — SIGNIFICANT CHANGE UP (ref 3.5–5.3)
POTASSIUM SERPL-SCNC: 4 MMOL/L — SIGNIFICANT CHANGE UP (ref 3.5–5.3)
PROT SERPL-MCNC: 7.2 G/DL — SIGNIFICANT CHANGE UP (ref 6–8.3)
PROTHROM AB SERPL-ACNC: 19.2 SEC — HIGH (ref 10–12.9)
RBC # BLD: 4.17 M/UL — SIGNIFICANT CHANGE UP (ref 3.8–5.2)
RBC # FLD: 13.2 % — SIGNIFICANT CHANGE UP (ref 10.3–14.5)
SODIUM SERPL-SCNC: 139 MMOL/L — SIGNIFICANT CHANGE UP (ref 135–145)
WBC # BLD: 8.08 K/UL — SIGNIFICANT CHANGE UP (ref 3.8–10.5)
WBC # FLD AUTO: 8.08 K/UL — SIGNIFICANT CHANGE UP (ref 3.8–10.5)

## 2018-10-31 PROCEDURE — 99232 SBSQ HOSP IP/OBS MODERATE 35: CPT

## 2018-10-31 RX ORDER — HYDROCHLOROTHIAZIDE 25 MG
12.5 TABLET ORAL DAILY
Qty: 0 | Refills: 0 | Status: DISCONTINUED | OUTPATIENT
Start: 2018-10-31 | End: 2018-11-02

## 2018-10-31 RX ORDER — DIAZEPAM 5 MG
2 TABLET ORAL ONCE
Qty: 0 | Refills: 0 | Status: DISCONTINUED | OUTPATIENT
Start: 2018-10-31 | End: 2018-10-31

## 2018-10-31 RX ORDER — WARFARIN SODIUM 2.5 MG/1
5 TABLET ORAL ONCE
Qty: 0 | Refills: 0 | Status: COMPLETED | OUTPATIENT
Start: 2018-10-31 | End: 2018-10-31

## 2018-10-31 RX ADMIN — LISINOPRIL 10 MILLIGRAM(S): 2.5 TABLET ORAL at 06:09

## 2018-10-31 RX ADMIN — TRAMADOL HYDROCHLORIDE 50 MILLIGRAM(S): 50 TABLET ORAL at 00:40

## 2018-10-31 RX ADMIN — Medication 100 MILLIGRAM(S): at 13:04

## 2018-10-31 RX ADMIN — Medication 25 MILLIGRAM(S): at 21:20

## 2018-10-31 RX ADMIN — TRAMADOL HYDROCHLORIDE 50 MILLIGRAM(S): 50 TABLET ORAL at 17:20

## 2018-10-31 RX ADMIN — Medication 2 MILLIGRAM(S): at 03:58

## 2018-10-31 RX ADMIN — LIDOCAINE 1 PATCH: 4 CREAM TOPICAL at 23:17

## 2018-10-31 RX ADMIN — AMIODARONE HYDROCHLORIDE 100 MILLIGRAM(S): 400 TABLET ORAL at 08:51

## 2018-10-31 RX ADMIN — Medication 5 MILLIGRAM(S): at 13:20

## 2018-10-31 RX ADMIN — TRAMADOL HYDROCHLORIDE 50 MILLIGRAM(S): 50 TABLET ORAL at 16:48

## 2018-10-31 RX ADMIN — Medication 100 MILLIGRAM(S): at 06:09

## 2018-10-31 RX ADMIN — Medication 12.5 MILLIGRAM(S): at 09:41

## 2018-10-31 RX ADMIN — ATORVASTATIN CALCIUM 20 MILLIGRAM(S): 80 TABLET, FILM COATED ORAL at 21:20

## 2018-10-31 RX ADMIN — LIDOCAINE 1 PATCH: 4 CREAM TOPICAL at 13:04

## 2018-10-31 RX ADMIN — LIDOCAINE 1 PATCH: 4 CREAM TOPICAL at 18:08

## 2018-10-31 RX ADMIN — Medication 100 MILLIGRAM(S): at 21:20

## 2018-10-31 RX ADMIN — WARFARIN SODIUM 5 MILLIGRAM(S): 2.5 TABLET ORAL at 21:20

## 2018-11-01 ENCOUNTER — TRANSCRIPTION ENCOUNTER (OUTPATIENT)
Age: 83
End: 2018-11-01

## 2018-11-01 LAB
INR BLD: 2.35 RATIO — HIGH (ref 0.88–1.16)
PROTHROM AB SERPL-ACNC: 26.3 SEC — HIGH (ref 10–12.9)

## 2018-11-01 PROCEDURE — 99232 SBSQ HOSP IP/OBS MODERATE 35: CPT

## 2018-11-01 RX ORDER — TRAMADOL HYDROCHLORIDE 50 MG/1
1 TABLET ORAL
Qty: 0 | Refills: 0 | COMMUNITY
Start: 2018-11-01

## 2018-11-01 RX ORDER — DIAZEPAM 5 MG
1 TABLET ORAL
Qty: 0 | Refills: 0 | COMMUNITY

## 2018-11-01 RX ORDER — LIDOCAINE 4 G/100G
1 CREAM TOPICAL
Qty: 0 | Refills: 0 | COMMUNITY
Start: 2018-11-01

## 2018-11-01 RX ORDER — WARFARIN SODIUM 2.5 MG/1
2.5 TABLET ORAL ONCE
Qty: 0 | Refills: 0 | Status: COMPLETED | OUTPATIENT
Start: 2018-11-01 | End: 2018-11-01

## 2018-11-01 RX ORDER — MAGNESIUM HYDROXIDE 400 MG/1
30 TABLET, CHEWABLE ORAL
Qty: 0 | Refills: 0 | COMMUNITY
Start: 2018-11-01

## 2018-11-01 RX ORDER — ACETAMINOPHEN 500 MG
2 TABLET ORAL
Qty: 0 | Refills: 0 | COMMUNITY
Start: 2018-11-01

## 2018-11-01 RX ORDER — AMIODARONE HYDROCHLORIDE 400 MG/1
0.5 TABLET ORAL
Qty: 0 | Refills: 0 | COMMUNITY
Start: 2018-11-01

## 2018-11-01 RX ORDER — SENNA PLUS 8.6 MG/1
2 TABLET ORAL
Qty: 0 | Refills: 0 | COMMUNITY
Start: 2018-11-01

## 2018-11-01 RX ORDER — POLYETHYLENE GLYCOL 3350 17 G/17G
17 POWDER, FOR SOLUTION ORAL
Qty: 0 | Refills: 0 | COMMUNITY
Start: 2018-11-01

## 2018-11-01 RX ORDER — DOCUSATE SODIUM 100 MG
1 CAPSULE ORAL
Qty: 0 | Refills: 0 | COMMUNITY
Start: 2018-11-01

## 2018-11-01 RX ADMIN — TRAMADOL HYDROCHLORIDE 50 MILLIGRAM(S): 50 TABLET ORAL at 01:00

## 2018-11-01 RX ADMIN — LIDOCAINE 1 PATCH: 4 CREAM TOPICAL at 17:31

## 2018-11-01 RX ADMIN — Medication 12.5 MILLIGRAM(S): at 05:37

## 2018-11-01 RX ADMIN — LIDOCAINE 1 PATCH: 4 CREAM TOPICAL at 23:00

## 2018-11-01 RX ADMIN — LISINOPRIL 10 MILLIGRAM(S): 2.5 TABLET ORAL at 05:37

## 2018-11-01 RX ADMIN — POLYETHYLENE GLYCOL 3350 17 GRAM(S): 17 POWDER, FOR SOLUTION ORAL at 05:39

## 2018-11-01 RX ADMIN — Medication 100 MILLIGRAM(S): at 13:39

## 2018-11-01 RX ADMIN — Medication 5 MILLIGRAM(S): at 01:00

## 2018-11-01 RX ADMIN — WARFARIN SODIUM 2.5 MILLIGRAM(S): 2.5 TABLET ORAL at 21:56

## 2018-11-01 RX ADMIN — Medication 100 MILLIGRAM(S): at 05:37

## 2018-11-01 RX ADMIN — LIDOCAINE 1 PATCH: 4 CREAM TOPICAL at 11:28

## 2018-11-01 RX ADMIN — Medication 5 MILLIGRAM(S): at 19:58

## 2018-11-01 RX ADMIN — TRAMADOL HYDROCHLORIDE 50 MILLIGRAM(S): 50 TABLET ORAL at 19:58

## 2018-11-01 RX ADMIN — TRAMADOL HYDROCHLORIDE 50 MILLIGRAM(S): 50 TABLET ORAL at 21:53

## 2018-11-01 RX ADMIN — ATORVASTATIN CALCIUM 20 MILLIGRAM(S): 80 TABLET, FILM COATED ORAL at 21:56

## 2018-11-01 RX ADMIN — Medication 25 MILLIGRAM(S): at 19:59

## 2018-11-01 NOTE — DISCHARGE NOTE ADULT - NS AS ACTIVITY OBS
Do not drive or operate machinery/Bathing allowed/Showering allowed/Walking-Outdoors allowed/Stairs allowed/Walking-Indoors allowed/No Heavy lifting/straining/Do not make important decisions

## 2018-11-01 NOTE — DISCHARGE NOTE ADULT - MEDICATION SUMMARY - MEDICATIONS TO TAKE
I will START or STAY ON the medications listed below when I get home from the hospital:    LSO brace  -- Dx: L2, L4 compression fx  To assist w ADLs  -- Indication: For Closed compression fracture of second lumbar vertebra, initial encounter    acetaminophen 325 mg oral tablet  -- 2 tab(s) by mouth every 6 hours, As needed, Temp greater or equal to 38.5C (101.3F), Mild Pain (1 - 3)  -- Indication: For Pain    traMADol 50 mg oral tablet  -- 1 tab(s) by mouth every 6 hours, As needed, Moderate Pain (4 - 6)  -- Indication: For Pain    ramipril 2.5 mg oral tablet  --  by mouth once a day  -- Indication: For Essential hypertension    magnesium hydroxide 8% oral suspension  -- 30 milliliter(s) by mouth once a day, As needed, Constipation  -- Indication: For Constipation    amiodarone 200 mg oral tablet  -- 0.5 tab(s) by mouth Monday, Wednesday, and Friday  -- Indication: For Paroxysmal a-fib    Coumadin 5 mg oral tablet  --  by mouth 5 times a week  -- Indication: For Paroxysmal a-fib    Coumadin 2.5 mg oral tablet  --  by mouth 2 x a week  -- Indication: For Paroxysmal a-fib    Valium 5 mg oral tablet  -- 1 tab(s) by mouth every 12 hours, As Needed  -- Indication: For Muscle spasm    Lipitor 20 mg oral tablet  -- 1 tab(s) by mouth once a day  -- Indication: For Hyperlipidemia, unspecified hyperlipidemia type    aMILoride-hydroCHLOROthiazide 5 mg-50 mg oral tablet  -- 1 tab(s) by mouth once a day  -- Indication: For Essential hypertension    Toprol-XL 25 mg oral tablet, extended release  -- 1 tab(s) by mouth once a day  -- Indication: For Essential hypertension    lidocaine 5% topical film  -- Apply on skin to affected area once a day  -- Indication: For Back pain    docusate sodium 100 mg oral capsule  -- 1 cap(s) by mouth 3 times a day  -- Indication: For Constipation    polyethylene glycol 3350 oral powder for reconstitution  -- 17 gram(s) by mouth 2 times a day  -- Indication: For Constipation    senna oral tablet  -- 2 tab(s) by mouth once a day (at bedtime)  -- Indication: For Constipation

## 2018-11-01 NOTE — DISCHARGE NOTE ADULT - CARE PROVIDER_API CALL
Yobany Rosas; MBBS), Internal Medicine  [address] [place] [id number]  Phone: [phone]  Fax: [phone]    Goldberg, Steven M (MD), Cardiovascular Disease; Internal Medicine  [address] [place] [id number]  Phone: [phone]  Fax: [phone]

## 2018-11-01 NOTE — DISCHARGE NOTE ADULT - PLAN OF CARE
No further falls. Low salt, low cholesterol diet.  Increase activity with PT at [facility].  Monitor INR twice a week and adjust dose of coumadin accordingly.   Monitor CBC/BMP once a week or more as needed.   Follow up with Dr. Rosas at [facility].   Follow up with Dr. Goldberg after discharge from rehab.

## 2018-11-01 NOTE — DISCHARGE NOTE ADULT - ADDITIONAL INSTRUCTIONS
Follow up with Dr. Rosas at [facility].   Follow up with Dr. Goldberg after discharge from rehab.

## 2018-11-01 NOTE — DISCHARGE NOTE ADULT - MEDICATION SUMMARY - MEDICATIONS TO CHANGE
I will SWITCH the dose or number of times a day I take the medications listed below when I get home from the hospital:    amiodarone  -- 50 milligram(s) by mouth Monday, Wednesday, and Friday    traMADol 50 mg oral tablet  --  by mouth once a day, As Needed    Valium 5 mg oral tablet  --  by mouth , As Needed

## 2018-11-01 NOTE — DISCHARGE NOTE ADULT - CARE PLAN
Principal Discharge DX:	Fall, initial encounter  Goal:	No further falls.  Assessment and plan of treatment:	Low salt, low cholesterol diet.  Increase activity with PT at [facility].  Monitor INR twice a week and adjust dose of coumadin accordingly.   Monitor CBC/BMP once a week or more as needed.   Follow up with Dr. Rosas at [facility].   Follow up with Dr. Goldberg after discharge from rehab.  Secondary Diagnosis:	Closed compression fracture of second lumbar vertebra, initial encounter  Secondary Diagnosis:	Paroxysmal a-fib  Secondary Diagnosis:	Acute low back pain without sciatica, unspecified back pain laterality  Secondary Diagnosis:	Essential hypertension  Secondary Diagnosis:	Hyperlipidemia, unspecified hyperlipidemia type  Secondary Diagnosis:	Spinal stenosis of lumbar region, unspecified whether neurogenic claudication present

## 2018-11-01 NOTE — DISCHARGE NOTE ADULT - SECONDARY DIAGNOSIS.
Closed compression fracture of second lumbar vertebra, initial encounter Paroxysmal a-fib Acute low back pain without sciatica, unspecified back pain laterality Essential hypertension Hyperlipidemia, unspecified hyperlipidemia type Spinal stenosis of lumbar region, unspecified whether neurogenic claudication present

## 2018-11-01 NOTE — DISCHARGE NOTE ADULT - PATIENT PORTAL LINK FT
You can access the [facility] Patient Portal, offered by [facility], by registering with the following website: [email or web address]

## 2018-11-01 NOTE — DISCHARGE NOTE ADULT - HOSPITAL COURSE
she was admitted after a fall and developed acute back pain.  X-ray was questionable for compression fracture.  MRI of the lumbosacral spine confirmed acute compression fractures of L2 and L4 vertebrae.  Patient most likely has pathological compression fractures secondary to osteoporosis.  Patient was recommended LSO brace by spine surgery.  Patient also was seen by physical therapy who recommended subacute rehabilitation.    Patient had an episode of atrial fibrillation with rapid ventricular rate.  She had chest pain with and was ruled out for myocardial infarction. patient converted to normal sinus rhythm on Cardizem drip.  She was seen by cardiology and pulmonary.    Issues stabilized and is being discharged to subacute rehabilitation for further care.

## 2018-11-02 VITALS
HEART RATE: 63 BPM | SYSTOLIC BLOOD PRESSURE: 120 MMHG | OXYGEN SATURATION: 99 % | RESPIRATION RATE: 18 BRPM | TEMPERATURE: 98 F | DIASTOLIC BLOOD PRESSURE: 63 MMHG

## 2018-11-02 LAB
INR BLD: 2.49 RATIO — HIGH (ref 0.88–1.16)
PROTHROM AB SERPL-ACNC: 27.9 SEC — HIGH (ref 10–12.9)

## 2018-11-02 RX ADMIN — LIDOCAINE 1 PATCH: 4 CREAM TOPICAL at 11:43

## 2018-11-02 RX ADMIN — Medication 12.5 MILLIGRAM(S): at 06:08

## 2018-11-02 RX ADMIN — AMIODARONE HYDROCHLORIDE 100 MILLIGRAM(S): 400 TABLET ORAL at 09:25

## 2018-11-02 RX ADMIN — LISINOPRIL 10 MILLIGRAM(S): 2.5 TABLET ORAL at 06:08

## 2018-11-02 NOTE — PROGRESS NOTE ADULT - PROBLEM SELECTOR PLAN 5
Continue patient on lisinopril, hydrochlorothiazide and metoprolol with holding parameters.  Monitor blood pressure per routine.
stable
Continue patient on lisinopril, hydrochlorothiazide and metoprolol with holding parameters.  Monitor blood pressure per routine.

## 2018-11-02 NOTE — PROGRESS NOTE ADULT - PROBLEM SELECTOR PLAN 4
Continue Lipitor.  Lipid panel noted.
PT
Continue Lipitor.  Lipid panel noted.

## 2018-11-02 NOTE — PROGRESS NOTE ADULT - PROBLEM SELECTOR PLAN 2
Controlled; resume low dose diuretic - patient describes chronic diuretic use and feels "swollen" since its discontinuation earlier this hospitalization.
Controlled; resume low dose diuretic - patient describes chronic diuretic use and feels "swollen" since its discontinuation earlier this hospitalization.  encourage po fluid intake
Fall precautions.  For REYES.
Fall precautions.  For REYES.
Fall precautions.  Physical therapy evaluation noted.  Patient will benefit from rehabilitation.
Pt  ambulate

## 2018-11-02 NOTE — PROGRESS NOTE ADULT - PROBLEM SELECTOR PROBLEM 5
Breast cancer
Essential hypertension

## 2018-11-02 NOTE — PROGRESS NOTE ADULT - REASON FOR ADMISSION
Fall and back pain

## 2018-11-02 NOTE — PROGRESS NOTE ADULT - PROBLEM SELECTOR PROBLEM 1
Acute low back pain without sciatica, unspecified back pain laterality
Paroxysmal a-fib

## 2018-11-02 NOTE — PROGRESS NOTE ADULT - PROBLEM SELECTOR PROBLEM 3
Valvular disease
Hypotension
Paroxysmal a-fib
Valvular disease
Paroxysmal a-fib

## 2018-11-02 NOTE — PROGRESS NOTE ADULT - PROBLEM SELECTOR PROBLEM 6
Aortic stenosis
Lumbar herniated disc

## 2018-11-02 NOTE — PROGRESS NOTE ADULT - PROBLEM SELECTOR PROBLEM 2
Closed compression fracture of second lumbar vertebra, initial encounter
Fall, initial encounter
Hypertension
Hypertension

## 2018-11-02 NOTE — PROGRESS NOTE ADULT - PROVIDER SPECIALTY LIST ADULT
Cardiology
Cardiology
Critical Care
Hospitalist
Internal Medicine
Orthopedics

## 2018-11-02 NOTE — PROGRESS NOTE ADULT - PROBLEM SELECTOR PLAN 3
S/p bioAVR
Continue patient on amiodarone And metoprolol.  Continue Coumadin with target INR of 2.5.
Continue patient on amiodarone And metoprolol.  Continue Coumadin with target INR of 2.5.
Patient is s/p Atrial Fibrillation with RVR.   Now in NSR.   Continue patient on amiodarone and metoprolol.  Continue Coumadin with target INR of 2.5.
Patient is s/p Atrial Fibrillation with RVR.   Now in NSR.   Continue patient on amiodarone and metoprolol.  Continue Coumadin with target INR of 2.5.
S/p bioAVR
Watch bp
Continue patient on amiodarone and metoprolol.  Continue Coumadin with target INR of 2.5.

## 2018-11-02 NOTE — PROGRESS NOTE ADULT - ATTENDING COMMENTS
Plan of care was discussed with patient  in great details, All questions were answered to their satisfaction.  Seems to understand, and in agreement
Abdominal pain: Due to constipation, improved with laxatives. Continue Miralax, colace and Senna.
will sign off
Pt at risk for recurrence
Abdominal pain: Possibly related to constipation. Will get X-ray of abdomen and pelvis. Bowel regimen. Encourage oral intake.
Stable for discharge to [facility].   I spent more than 35 minutes on discharging the patient.
Stable for discharge to [facility].   Refusing to leave today.   IMM given to patient by .

## 2018-11-02 NOTE — PROGRESS NOTE ADULT - ASSESSMENT
** D/c planning.
83 years old female with past medical history of Atrial Fibrillation, Severe spinal stenosis, herniated disc in back, Mitral valve replacement, Old compression fracture of lumbar spine, who fell backwards on her buttocks while picking up a box in her garage. Patient developed back pain and was brought in to ER.     1. Back pain.  MRI shows Mild acute L2 and L4 compression deformities without significant retropulsion.  Discussed case with Spine surgery who recommended Back brace, pain management, and PT  -Continue with pain medications    2. Atrial with RVR.  Converted to NSR.  Continue with amiodarone, and metoprolol for rate control.    -Continue with coumadin, and Monitor INR  -Cardiology to follow up     3. HTN.  Continue with lisinopril, and Metoprolol.  Monitor BP    4.  BILL.  Resolved with hydration.  Hold HCTZ.  Monitor Renal function  -D/C IVF      Stable to go back to [facility]  transfer care back to Dr. Rosas.  Sign out was given
83 years old female with past medical history of Atrial Fibrillation, Severe spinal stenosis, herniated disc in back, Mitral valve replacement, Old compression fracture of lumbar spine, who fell backwards on her buttocks while picking up a box in her garage. Patient developed back pain and was brought in to ER.     1. Back pain.  MRI shows Mild acute L2 and L4 compression deformities without significant retropulsion.  Discussed case with Spine surgery who recommended Back brace, pain management, and PT  -Continue with pain medications. Patient refusing to go to [facility] b/c she still doesn't feel strong or well enough due to pain.    2. Atrial with RVR.  Converted to NSR.  Continue with amiodarone, and metoprolol for rate control.    -Continue with coumadin, and Monitor INR  -Cardiology to follow up     3. HTN.  Continue with lisinopril, and Metoprolol.  Monitor BP    4.  BILL.  Resolved with hydration.  Hold HCTZ.  Monitor Renal function  -D/C IVF
83 years old female with past medical history of Atrial Fibrillation, Severe spinal stenosis, herniated disc in back, Mitral valve replacement, Old compression fracture of lumbar spine, who fell backwards on her buttocks while picking up a box in her garage. Patient developed back pain and was brought in to ER.     She was given pain medications and was tried to be ambulated. Patient couldn't ambulate and was admitted for pain control and further management.
83F with PMHx as above, initially admitted for s/p fall with lumbar compression fractures/pain, now s/p Rapid Response for Afib with RVR, HTN Urgency, and chest pain. Pt upgraded to ICU for further management    -Pain management  -Valium for anxiety  -Titrate Cardizem gtt to HR < 110 as tolerated to max dose of 15mg/hr  -Monitor BP and treat as needed to avoid end organ damage  -Continue Amiodarone. May benefit from bolus and gtt if no improvement with cardizem gtt  -Consider Digoxin. Consult with Cardiology given LVH on TTE  -Continue coumadin per INR  -Cardiology consult  -Trend CE's. Initial troponin negative  -Pt with mild BILL on recent chemistries. Pt not taking much po likely due to dehydration. Will start gentle hydration  -Avoid nephrotoxic agents. Monitor renal function  -DVT ppx  -Supportive care
Pt admitted for compression fx lumbar spine, developed paroxysmal atrial fib and hypotension. Now NSR. BP improved, probably dehydrated.  C/o back pain .  WIll need rehab and extensive PT
Pt admitted for compression fx lumbar spine, developed paroxysmal atrial fib and hypotension. Now NSR. BP improved, probably dehydrated.  Pt needs to be fully coumadinized.   C/o back pain , some abdominal discomfort due to pain  meds?  WIll need rehab and extensive PT
Pt admitted for compression fx lumbar spine, developed paroxysmal atrial fib and hypotension. Now NSR. BP improved, probably dehydrated.  Pt now fully coumadinized.   C/o back pain ,   WIll need rehab and extensive PT
** D/c planning.

## 2018-11-02 NOTE — PROGRESS NOTE ADULT - PROBLEM SELECTOR PLAN 1
Patient is acute low back pain possibly secondary to contusion from fall.  Awaiting MRI of the lumbosacral spine to rule out any Acute compression fractures.  Continue patient on pain medications as needed.  Encourage ambulation with physical therapy.  Will benefit from rehabilitation.
Patient is acute low back pain possibly secondary to contusion from fall.  Awaiting MRI of the lumbosacral spine to rule out any Acute compression fractures.  Continue patient on pain medications as needed.  Encourage ambulation with physical therapy.  Will benefit from rehabilitation.
Patient is acute low back pain possibly secondary to contusion from fall.  Patient with acute compression fracture of L2 and L4, pathological due to osteoporosis, POA.   LSO brace per spine surgery.   Stable for REYES.
Patient is acute low back pain possibly secondary to contusion from fall.  Patient with acute compression fracture of L2 and L4, pathological due to osteoporosis, POA.   LSO brace per spine surgery.   Stable for REYES.
Patient is acute low back pain possibly secondary to contusion from fall.  Patient with acute compression fracture of L2 and L4, pathological due to osteoporosis, POA.   Will get Spine surgery to see patient.   Continue pain meds and PT.   WBAT.   Will benefit from rehabilitation.
Presently in sinus rhythm; continue warfarin (therapeutic INR today )
cardio fu
cardio fu  Watch INR
cardio fu  Watch INR  Transfer [employer]
Presently in sinus rhythm; continue warfarin (subtherapeutic INR today but she took a higher dose than usual of warfarin last night - expect INR to increase on 11/1).

## 2018-11-02 NOTE — PROGRESS NOTE ADULT - SUBJECTIVE AND OBJECTIVE BOX
Order received to fit and deliver LSO for compression fractures .  Pt measured while in chair.  Orthosis adjusted to measurements.  Pt instructed to don and doff  Follow up if needed      Johnathan TONEY  allpro [phone]
83F with PMHx of Lumbar herniated disc, lumbar spinal stenosis, hld, htn, paroxysmal afib, severe aortic stenosis, left breast ca s/p lumpectomy, s/p bilateral mastectomy, s/p right shoulder repair, s/p hysterectomy, initially admitted s/p fall with resultant back pain and compression fractures. Pt upgraded to ICU post Rapid Response for afib with RVR, HTN Urgency and Chest Pain. Pt treated with IV lopressor/Cardizem with transient improvement to HR and then converting back into rapid afib. Pt started on Cardizem gtt and transferred to ICU for further management. Pt seen and examined during Rapid Response and again upon arrival to ICU. Pt's Son (Dr. Leger) at bedside later.       PAST MEDICAL & SURGICAL HISTORY:  Lumbar herniated disc  Spinal stenosis of lumbar region  Hyperlipidemia  Hypertension  Paroxysmal a-fib  Aortic stenosis: severe  Breast cancer: left breast  Obesity (BMI 30-39.9)  AF (paroxysmal atrial fibrillation): x 2 cardioversions - 9/2008, 2009 - chemical  S/P shoulder surgery: right humerus fracture repair 1999  S/P hysterectomy: 2003  S/P mastectomy, bilateral: january 2002  S/P lumpectomy of breast: left breast - with radiation 1990      Review of Systems:  Constitutional: No fever, chills, fatigue  Neuro: No headache, numbness, weakness  Resp: No cough, wheezing, shortness of breath  CVS: + chest pain with associated left arm pain, +palpitations, leg swelling  GI: No abdominal pain, nausea, vomiting, diarrhea   : No dysuria, frequency, incontinence  Skin: No itching, burning, rashes, or lesions   Msk: No joint pain or swelling  Psych: No depression, anxiety, mood swings    T(F): 97.4 (10-29-18 @ 21:07), Max: 98.9 (10-29-18 @ 19:50)  HR: 108 (10-29-18 @ 21:07) (63 - 153)  BP: 123/68 (10-29-18 @ 21:07) (89/55 - 240/110)  RR: 18 (10-29-18 @ 21:07) (14 - 22)  SpO2: 100% (10-29-18 @ 21:07) (92% - 100%)  Wt(kg): --        CAPILLARY BLOOD GLUCOSE      POCT Blood Glucose.: 100 mg/dL (29 Oct 2018 19:54)      I&O's Summary    28 Oct 2018 07:01  -  29 Oct 2018 07:00  --------------------------------------------------------  IN: 240 mL / OUT: 0 mL / NET: 240 mL        Physical Exam:     Gen: moderated distress  Neuro: A&Ox3, non-focal  HEENT: NC/AT  Resp: CTA b/l  CVS: nl S1/S2, tachy, irregular  Abd: soft, nt, nd, +bs  Ext: no edema, +pulses  Skin: well perfused, warm      Meds:    amiodarone    Tablet Oral  diltiazem Infusion IV Continuous  hydrochlorothiazide Oral  lisinopril Oral  metoprolol succinate ER Oral  atorvastatin Oral  acetaminophen   Tablet .. Oral PRN  diazepam    Tablet Oral PRN  traMADol Oral PRN  docusate sodium Oral  magnesium hydroxide Suspension Oral PRN  polyethylene glycol 3350 Oral  senna Oral  sodium chloride 0.9%. IV Continuous  lidocaine   Patch Transdermal                              12.0   10.46 )-----------( 147      ( 29 Oct 2018 07:46 )             35.3       10-29    138  |  99  |  51<H>  ----------------------------<  115<H>  3.7   |  30  |  1.41<H>    Ca    9.8      29 Oct 2018 20:21        CARDIAC MARKERS ( 29 Oct 2018 20:21 )  .000 ng/mL / x     / x     / x     / x          PT/INR - ( 29 Oct 2018 07:46 )   PT: 23.9 sec;   INR: 2.16 ratio         CXR:  Portable study, 1:01 AM.     Comparison exam dated 8/7/2013.     Shallow inspiration, elevated diaphragm. Sternal sutures present. Aortic   valve prosthesis present. Prominent interstitial lung markings present. No   lobar consolidation or effusion. Heart size appears enlarged although   magnified secondary to portable technique and the shallow inspiration.     No acute osseous abnormalities. Fixation plate present right humeral neck.   Surgical clips present, left axilla.     IMPRESSION:     See above report     MR Lumbar Spine:  INTERPRETATION: History: Fall, low back pain     MRI lumbar spine direct sagittal axial imaging multiple pulse sequences. No   exogenous contrast.     Images are of diagnostic quality.   Preservation of normal lordosis. Mild acute L2 and L4 compression   deformities noted without significant retropulsion. There is associated   vertebral body marrow edema. At L2 there is a fluid-filled cleft extending   from the anterior to posterior vertebral body margin.   All discs are desiccated. Marked narrowing L4-L5 disc.   Individual levels as follows:   T12-L1: Unremarkable.   L1/L2: No significant disc bulge, canal or foraminal compromise.   L2/L3: Mild broad-based annular bulge without significant foraminal   compromise. Disc bulge combines with facet arthropathy ligamentum flavum   redundancy resulting in moderate to severe central canal stenosis.   L3/L4: Mild broad-based annular bulge resulting in moderate bilateral   foraminal stenosis. Moderate bilateral facet arthropathy and ligamentum   flavum redundancy combines with disc disease resulting in moderate central   canal stenosis.   L4-L5: Moderate broad-based annular bulge with superimposed posterior   annular tear. Secondary moderate to severe bilateral foraminal stenosis.   Marked bilateral facet arthropathy ligamentum flavum redundancy combines   with disc disease resulting in severe central canal stenosis.   L5/S1: No significant disc bulge canal or foraminal compromise. Moderate   bilateral facet arthropathy.   The conus terminates at L1.   Paraspinal soft tissues unremarkable.     Impression:     Mild acute L2 and L4 compression deformities without significant   retropulsion.   Multilevel degenerative change as described with secondary multilevel canal   and foraminal compromise.     Xray Abd:    No dilated small bowel loops or air-fluid levels are seen.     Markedly redundant, air-filled colon with severe fecal retention in the   ascending/transverse and moderate retention in the rectum.     No mass effect or organomegaly.     No pathologic intra-abdominal calcifications.     No free air under the diaphragm.     IMPRESSION:     Nonobstructive bowel gas pattern.     Markedly redundant, air-filled colon with severe fecal retention in the   ascending/transverse and moderate retention in the rectum.           CENTRAL LINE: no    MOELLER: no    A-LINE: no    GLOBAL ISSUE/BEST PRACTICE:  Analgesia: yes  Sedation: yes  HOB elevation: yes  Stress ulcer prophylaxis: yes  VTE prophylaxis: yes  Glycemic control: yes  Nutrition: yes      CODE STATUS: Full  GOC discussion: Y  Critical care time spent (mins): 43  (Reviewing data, imaging, discussing with multidisciplinary team, non inclusive of procedures, discussing goals of care with patient/family)
CC.  Back pain    HPI.  Patient reports back pain is controlled.  Denies any numbness, weakness, or change in BM, or pelvic numbness    Patient converted to NSR overnight.  Cardizem drip D/Miles overnight.  Chest pain resolved    Offers no other complaints                Constitutional: No fever, fatigue or weight loss.  Skin: No rash.  Eyes: No recent vision problems or eye pain.  ENT: No congestion, ear pain, or sore throat.  Endocrine: No thyroid problems.  Cardiovascular: No chest pain or palpation.  Respiratory: No cough, shortness of breath, congestion, or wheezing.  Gastrointestinal: No abdominal pain, nausea, vomiting, or diarrhea.  Genitourinary: No dysuria.  Musculoskeletal: + back pain  Neurologic: No headache.      Vital Signs Last 24 Hrs  T(C): 36.8 (10-30-18 @ 12:36), Max: 37.2 (10-29-18 @ 19:50)  T(F): 98.2 (10-30-18 @ 12:36), Max: 98.9 (10-29-18 @ 19:50)  HR: 63 (10-30-18 @ 12:00) (57 - 153)  BP: 107/50 (10-30-18 @ 12:00) (85/48 - 240/110)  BP(mean): 67 (10-30-18 @ 12:00) (60 - 73)  RR: 14 (10-30-18 @ 12:00) (14 - 31)  SpO2: 97% (10-30-18 @ 12:00) (93% - 100%)        PHYSICAL EXAM-  GENERAL: NAD   HEAD:  Atraumatic, Normocephalic  EYES: EOMI, PERRLA, conjunctiva and sclera clear  NECK: Supple, No JVD, Normal thyroid  NERVOUS SYSTEM:  Alert & Oriented X3, Moving all extremities   CHEST/LUNG: Clear to percussion bilaterally; No rales, rhonchi, wheezing, or rubs  HEART: Regular rate and rhythm; No murmurs, rubs, or gallops  ABDOMEN: Soft, Nontender, Nondistended; Bowel sounds present  EXTREMITIES:  No clubbing, cyanosis, or edema  SKIN: No rashes or lesions                                  11.8   7.47  )-----------( 145      ( 30 Oct 2018 06:31 )             35.2     10-30    139  |  103  |  44<H>  ----------------------------<  105<H>  3.6   |  30  |  1.24    Ca    9.1      30 Oct 2018 06:31      CARDIAC MARKERS ( 30 Oct 2018 01:57 )  .000 ng/mL / x     / x     / x     / x      CARDIAC MARKERS ( 29 Oct 2018 20:21 )  .000 ng/mL / x     / x     / x     / x              PT/INR - ( 30 Oct 2018 06:31 )   PT: 20.4 sec;   INR: 1.85 ratio                 MEDICATIONS  (STANDING):  amiodarone    Tablet 100 milliGRAM(s) Oral <User Schedule>  atorvastatin 20 milliGRAM(s) Oral at bedtime  diltiazem Infusion 5 mG/Hr (5 mL/Hr) IV Continuous <Continuous>  docusate sodium 100 milliGRAM(s) Oral three times a day  hydrochlorothiazide 50 milliGRAM(s) Oral daily  lidocaine   Patch 1 Patch Transdermal daily  lisinopril 10 milliGRAM(s) Oral daily  metoprolol succinate ER 25 milliGRAM(s) Oral daily  polyethylene glycol 3350 17 Gram(s) Oral two times a day  senna 2 Tablet(s) Oral at bedtime  warfarin 2.5 milliGRAM(s) Oral once    MEDICATIONS  (PRN):  acetaminophen   Tablet .. 650 milliGRAM(s) Oral every 6 hours PRN Temp greater or equal to 38.5C (101.3F), Mild Pain (1 - 3)  diazepam    Tablet 5 milliGRAM(s) Oral every 12 hours PRN muscle spasm  magnesium hydroxide Suspension 30 milliLiter(s) Oral daily PRN Constipation  traMADol 50 milliGRAM(s) Oral every 6 hours PRN Moderate Pain (4 - 6)    Imaging Personally Reviewed:     [x ] YES  [ ] NO    Consultant(s) Notes Reviewed:  [x ] YES  [ ] NO    Care Discussed with Consultants/Other Providers [x ] YES  [ ] No medical contraindication for discharge
PULMONARY/CRITICAL CARE      INTERVAL HPI/OVERNIGHT EVENTS:  Multiple complaints. Has been in NSR. No cp, palpitations. Feels weak .  C/o back pain.  83y FemaleHPI:  83 years old female with past medical history of Atrial Fibrillation, Severe spinal stenosis, herniated disc in back, Mitral valve replacement, Old compression fracture of lumbar spine, who fell backwards on her buttocks while picking up a box in her garage. Patient developed back pain and was brought in to ER.     She was given pain medications and was tried to be ambulated. Patient couldn't ambulate and was admitted for pain control and further management.     patient continues to c/o back pain. Denies any numbness or weakness. No fever or chills. No bowel or bladder control issuers. No nausea or vomiting. No LOC. No head injury. (26 Oct 2018 13:15)        PAST MEDICAL & SURGICAL HISTORY:  Lumbar herniated disc  Spinal stenosis of lumbar region  Hyperlipidemia  Hypertension  Paroxysmal a-fib  Aortic stenosis: severe  Breast cancer: left breast  Obesity (BMI 30-39.9)  AF (paroxysmal atrial fibrillation): x 2 cardioversions - 9/2008, 2009 - chemical  S/P shoulder surgery: right humerus fracture repair 1999  S/P hysterectomy: 2003  S/P mastectomy, bilateral: january 2002  S/P lumpectomy of breast: left breast - with radiation 1990        ICU Vital Signs Last 24 Hrs  T(C): 36.4 (31 Oct 2018 05:30), Max: 36.8 (30 Oct 2018 12:36)  T(F): 97.6 (31 Oct 2018 05:30), Max: 98.2 (30 Oct 2018 12:36)  HR: 61 (31 Oct 2018 05:30) (57 - 75)  BP: 118/77 (31 Oct 2018 05:30) (94/46 - 150/68)  BP(mean): 61 (30 Oct 2018 21:00) (61 - 96)  ABP: --  ABP(mean): --  RR: 18 (31 Oct 2018 05:30) (12 - 21)  SpO2: 95% (31 Oct 2018 05:30) (90% - 100%)    Qtts:     I&O's Summary    30 Oct 2018 07:01  -  31 Oct 2018 07:00  --------------------------------------------------------  IN: 495 mL / OUT: 300 mL / NET: 195 mL            REVIEW OF SYSTEMS:    CONSTITUTIONAL: No fever, weight loss, or fatigue  RESPIRATORY: No cough, wheezing, chills or hemoptysis; No shortness of breath  CARDIOVASCULAR: No chest pain, palpitations, dizziness, or leg swelling  GASTROINTESTINAL: No abdominal or epigastric pain. No nausea, vomiting, or hematemesis; No diarrhea or constipation. No melena or hematochezia.  GENITOURINARY: No dysuria, frequency, hematuria, or incontinence  NEUROLOGICAL: No headaches, memory loss, loss of strength, numbness, or tremors  SKIN: No itching, burning, rashes, or lesions   LYMPH NODES: No enlarged glands  ENDOCRINE: No heat or cold intolerance; No hair loss  MUSCULOSKELETAL: No joint pain or swelling; Has pain, back,  no calf tenderness  PSYCHIATRIC: No depression, anxiety, mood swings, or difficulty sleeping        PHYSICAL EXAM:    GENERAL: NAD, well-groomed, well-developed,  HEAD:  Atraumatic, Normocephalic  EYES: EOMI, PERRLA, conjunctiva and sclera clear  ENMT: No tonsillar erythema, exudates, or enlargement; Moist mucous membranes, Good dentition, No lesions  NECK: Supple, No JVD, Normal thyroid  NERVOUS SYSTEM:  Alert & Oriented X3, Good concentration; Motor Strength 5/5 B/L upper and lower extremities  CHEST/LUNG: Clear to percussion bilaterally; No rales, rhonchi, wheezing, or rubs  HEART: Regular rate and rhythm; 2/6 sys murmur, no rub r gallop  ABDOMEN: Soft, Nontender, Nondistended; Bowel sounds present  EXTREMITIES:  2+ Peripheral Pulses, No clubbing, cyanosis, or edema  LYMPH: No lymphadenopathy noted  SKIN: No rashes or lesions        LABS:                        11.8   7.47  )-----------( 145      ( 30 Oct 2018 06:31 )             35.2     10-30    139  |  103  |  44<H>  ----------------------------<  105<H>  3.6   |  30  |  1.24    Ca    9.1      30 Oct 2018 06:31      PT/INR - ( 30 Oct 2018 06:31 )   PT: 20.4 sec;   INR: 1.85 ratio               vanco through     RADIOLOGY & ADDITIONAL STUDIES:  < from: MR Lumbar Spine No Cont (10.29.18 @ 12:00) >  EXAM:  MR SPINE LUMBAR                            PROCEDURE DATE:  10/29/2018          INTERPRETATION:  History: Fall, low back pain    MRI lumbar spine direct sagittal axial imaging multiple pulse sequences.   No exogenous contrast.    Images are of diagnostic quality.  Preservation of normal lordosis. Mild acute L2 and L4 compression   deformities noted without significant retropulsion. There is associated   vertebral body marrow edema. At L2 there is a fluid-filled cleft   extending from theanterior to posterior vertebral body margin.  All discs are desiccated. Marked narrowing L4-L5 disc.   Individual levels as follows:  T12-L1: Unremarkable.  L1/L2: No significant disc bulge, canal or foraminal compromise.  L2/L3: Mild broad-based annular bulge without significant foraminal   compromise. Disc bulge combines with facet arthropathy ligamentum flavum   redundancy resulting in moderate to severe central canal stenosis.  L3/L4: Mild broad-based annular bulge resulting in moderate bilateral   foraminal stenosis. Moderate bilateral facet arthropathy and ligamentum   flavum redundancy combines with disc disease resulting in moderate   central canal stenosis.  L4-L5: Moderate broad-based annular bulge with superimposed posterior   annular tear. Secondary moderate to severe bilateral foraminal stenosis.   Marked bilateral facet arthropathy ligamentum flavum redundancy combines   with disc disease resulting in severe central canal stenosis.  L5/S1: No significant disc bulge canal or foraminal compromise. Moderate   bilateral facet arthropathy.  The conus terminates at L1.  Paraspinal soft tissues unremarkable.    Impression:    Mild acute L2 and L4 compression deformities without significant   retropulsion.  Multilevel degenerative change as described with secondary multilevel   canal and foraminal compromise.                GOLDY UMAÑA M.D., ATTENDING RADIOLOGIST  This document has been electronically signed. Oct 29 2018  1:51PM    < end of copied text >  < from: Xray Abdomen 2 Views (10.28.18 @ 17:23) >  EXAM:  XR ABDOMEN 2V                                  PROCEDURE DATE:  10/28/2018          INTERPRETATION:  XR ABDOMEN 2V    HISTORY: Generalized Abdominal pain status post fall    VIEWS:  Supine and erect        COMPARISON:  None    No dilated small bowel loops or air-fluid levels are seen.    Markedly redundant, air-filled colon with severe fecal retention in the   ascending/transverse and moderate retention in the rectum.    No mass effect or organomegaly.    No pathologic intra-abdominal calcifications.    No free air under the diaphragm.    IMPRESSION:    Nonobstructive bowel gas pattern.    Markedly redundant, air-filled colon with severe fecal retention in the   ascending/transverse and moderate retention in the rectum.        < end of copied text >      CRITICAL CARE TIME SPENT:
PULMONARY/CRITICAL CARE      INTERVAL HPI/OVERNIGHT EVENTS:  No sob. Hr has been stable. Still back pain.     83y FemaleHPI:  83 years old female with past medical history of Atrial Fibrillation, Severe spinal stenosis, herniated disc in back, Mitral valve replacement, Old compression fracture of lumbar spine, who fell backwards on her buttocks while picking up a box in her garage. Patient developed back pain and was brought in to ER.     She was given pain medications and was tried to be ambulated. Patient couldn't ambulate and was admitted for pain control and further management.     patient continues to c/o back pain. Denies any numbness or weakness. No fever or chills. No bowel or bladder control issuers. No nausea or vomiting. No LOC. No head injury. (26 Oct 2018 13:15)        PAST MEDICAL & SURGICAL HISTORY:  Lumbar herniated disc  Spinal stenosis of lumbar region  Hyperlipidemia  Hypertension  Paroxysmal a-fib  Aortic stenosis: severe  Breast cancer: left breast  Obesity (BMI 30-39.9)  AF (paroxysmal atrial fibrillation): x 2 cardioversions - 9/2008, 2009 - chemical  S/P shoulder surgery: right humerus fracture repair 1999  S/P hysterectomy: 2003  S/P mastectomy, bilateral: january 2002  S/P lumpectomy of breast: left breast - with radiation 1990        ICU Vital Signs Last 24 Hrs  T(C): 36.7 (02 Nov 2018 05:06), Max: 36.9 (01 Nov 2018 13:39)  T(F): 98 (02 Nov 2018 05:06), Max: 98.5 (01 Nov 2018 13:39)  HR: 60 (02 Nov 2018 05:06) (57 - 71)  BP: 115/56 (02 Nov 2018 05:06) (107/65 - 151/64)  BP(mean): --  ABP: --  ABP(mean): --  RR: 17 (02 Nov 2018 05:06) (17 - 19)  SpO2: 99% (02 Nov 2018 05:06) (94% - 100%)    Qtts:     I&O's Summary    01 Nov 2018 07:01  -  02 Nov 2018 07:00  --------------------------------------------------------  IN: 100 mL / OUT: 250 mL / NET: -150 mL          REVIEW OF SYSTEMS:    CONSTITUTIONAL: No fever, weight loss, or fatigue  RESPIRATORY: No cough, wheezing, chills or hemoptysis; No shortness of breath  CARDIOVASCULAR: No chest pain, palpitations, dizziness, or leg swelling  NEUROLOGICAL: No headaches, memory loss, loss of strength, numbness, or tremors  MUSCULOSKELETAL: No joint pain or swelling; Has pain, back,  no calf tenderness  PSYCHIATRIC: No depression, anxiety, mood swings, or difficulty sleeping        PHYSICAL EXAM:    GENERAL: NAD, well-groomed, well-developed,  HEAD:  Atraumatic, Normocephalic  EYES: EOMI, PERRLA, conjunctiva and sclera clear  ENMT: No tonsillar erythema, exudates, or enlargement; Moist mucous membranes, Good dentition, No lesions  NECK: Supple, No JVD, Normal thyroid  NERVOUS SYSTEM:  Alert & Oriented X3, Good concentration; Motor Strength 5/5 B/L upper and lower extremities  CHEST/LUNG: Clear to percussion bilaterally; No rales, rhonchi, wheezing, or rubs  HEART: Regular rate and rhythm; 2/6 sys murmur, no rub r gallop  ABDOMEN: Soft, Nontender, mildly distended; Bowel sounds present  EXTREMITIES:  2+ Peripheral Pulses, No clubbing, cyanosis, or edema  LYMPH: No lymphadenopathy noted  SKIN: No rashes or lesions          LABS:                        12.6   8.08  )-----------( 190      ( 31 Oct 2018 07:49 )             37.7     10-31    139  |  101  |  38<H>  ----------------------------<  102<H>  4.0   |  30  |  1.20    Ca    9.8      31 Oct 2018 07:49  Mg     2.0     10-31    TPro  7.2  /  Alb  3.1<L>  /  TBili  0.6  /  DBili  x   /  AST  13  /  ALT  19  /  AlkPhos  81  10-31    PT/INR - ( 01 Nov 2018 08:05 )   PT: 26.3 sec;   INR: 2.35 ratio         PTT - ( 31 Oct 2018 07:49 )  PTT:35.0 sec      vanco through     RADIOLOGY & ADDITIONAL STUDIES:< from: MR Lumbar Spine No Cont (10.29.18 @ 12:00) >  EXAM:  MR SPINE LUMBAR                            PROCEDURE DATE:  10/29/2018          INTERPRETATION:  History: Fall, low back pain    MRI lumbar spine direct sagittal axial imaging multiple pulse sequences.   No exogenous contrast.    Images are of diagnostic quality.  Preservation of normal lordosis. Mild acute L2 and L4 compression   deformities noted without significant retropulsion. There is associated   vertebral body marrow edema. At L2 there is a fluid-filled cleft   extending from theanterior to posterior vertebral body margin.  All discs are desiccated. Marked narrowing L4-L5 disc.   Individual levels as follows:  T12-L1: Unremarkable.  L1/L2: No significant disc bulge, canal or foraminal compromise.  L2/L3: Mild broad-based annular bulge without significant foraminal   compromise. Disc bulge combines with facet arthropathy ligamentum flavum   redundancy resulting in moderate to severe central canal stenosis.  L3/L4: Mild broad-based annular bulge resulting in moderate bilateral   foraminal stenosis. Moderate bilateral facet arthropathy and ligamentum   flavum redundancy combines with disc disease resulting in moderate   central canal stenosis.  L4-L5: Moderate broad-based annular bulge with superimposed posterior   annular tear. Secondary moderate to severe bilateral foraminal stenosis.   Marked bilateral facet arthropathy ligamentum flavum redundancy combines   with disc disease resulting in severe central canal stenosis.  L5/S1: No significant disc bulge canal or foraminal compromise. Moderate   bilateral facet arthropathy.  The conus terminates at L1.  Paraspinal soft tissues unremarkable.    Impression:< from: Xray Chest 1 View-PORTABLE IMMEDIATE (10.26.18 @ 01:07) >    EXAM:  XR CHEST PORTABLE IMMED 1V                                  PROCEDURE DATE:  10/26/2018          INTERPRETATION:  Clinical information: Pain    Portable study, 1:01 AM.    Comparison exam dated 8/7/2013.    Shallow inspiration, elevated diaphragm. Sternal sutures present. Aortic   valve prosthesis present. Prominent interstitial lung markings present.   No lobar consolidation or effusion. Heart size appears enlarged although   magnified secondary to portable technique and the shallow inspiration.    No acute osseous abnormalities. Fixation plate present right humeral   neck. Surgical clips present, left axilla.    IMPRESSION:    See above report                  ANGI YAÑEZ M.D.,ATTENDING RADIOLOGIST    < end of copied text >      Mild acute L2 and L4 compression deformities without significant   retropulsion.  Multilevel degenerative change as described with secondary multilevel   canal and foraminal compromise.                GODLY UMAÑA M.D., ATTENDING RADIOLOGIST  This document has been electronically signed. Oct 29 2018  1:51PM              < end of copied text >        CRITICAL CARE TIME SPENT:
PULMONARY/CRITICAL CARE      INTERVAL HPI/OVERNIGHT EVENTS: Still c/o back pain. No sob. Had PT. Hr better.    83y FemaleHPI:  83 years old female with past medical history of Atrial Fibrillation, Severe spinal stenosis, herniated disc in back, Mitral valve replacement, Old compression fracture of lumbar spine, who fell backwards on her buttocks while picking up a box in her garage. Patient developed back pain and was brought in to ER.     She was given pain medications and was tried to be ambulated. Patient couldn't ambulate and was admitted for pain control and further management.     patient continues to c/o back pain. Denies any numbness or weakness. No fever or chills. No bowel or bladder control issuers. No nausea or vomiting. No LOC. No head injury. (26 Oct 2018 13:15)        PAST MEDICAL & SURGICAL HISTORY:  Lumbar herniated disc  Spinal stenosis of lumbar region  Hyperlipidemia  Hypertension  Paroxysmal a-fib  Aortic stenosis: severe  Breast cancer: left breast  Obesity (BMI 30-39.9)  AF (paroxysmal atrial fibrillation): x 2 cardioversions - 9/2008, 2009 - chemical  S/P shoulder surgery: right humerus fracture repair 1999  S/P hysterectomy: 2003  S/P mastectomy, bilateral: january 2002  S/P lumpectomy of breast: left breast - with radiation 1990        ICU Vital Signs Last 24 Hrs  T(C): 36.4 (01 Nov 2018 05:13), Max: 36.8 (31 Oct 2018 13:20)  T(F): 97.6 (01 Nov 2018 05:13), Max: 98.3 (31 Oct 2018 21:13)  HR: 62 (01 Nov 2018 05:13) (62 - 72)  BP: 104/66 (01 Nov 2018 05:13) (101/66 - 113/70)  BP(mean): --  ABP: --  ABP(mean): --  RR: 18 (01 Nov 2018 05:13) (18 - 18)  SpO2: 97% (01 Nov 2018 05:13) (94% - 99%)    Qtts:     I&O's Summary    31 Oct 2018 07:01  -  01 Nov 2018 07:00  --------------------------------------------------------  IN: 730 mL / OUT: 301 mL / NET: 429 mL        REVIEW OF SYSTEMS:    CONSTITUTIONAL: No fever, weight loss, or fatigue  RESPIRATORY: No cough, wheezing, chills or hemoptysis; No shortness of breath  CARDIOVASCULAR: No chest pain, palpitations, dizziness, or leg swelling  GASTROINTESTINAL: No abdominal or epigastric pain. No nausea, vomiting, or hematemesis; No diarrhea or constipation. No melena or hematochezia.  Mild abdominal distension  GENITOURINARY: No dysuria, frequency, hematuria, or incontinence  NEUROLOGICAL: No headaches, memory loss, loss of strength, numbness, or tremors  MUSCULOSKELETAL: No joint pain or swelling; Has pain, back,  no calf tenderness  PSYCHIATRIC: No depression, anxiety, mood swings, or difficulty sleeping        PHYSICAL EXAM:    GENERAL: NAD, well-groomed, well-developed,  HEAD:  Atraumatic, Normocephalic  EYES: EOMI, PERRLA, conjunctiva and sclera clear  ENMT: No tonsillar erythema, exudates, or enlargement; Moist mucous membranes, Good dentition, No lesions  NECK: Supple, No JVD, Normal thyroid  NERVOUS SYSTEM:  Alert & Oriented X3, Good concentration; Motor Strength 5/5 B/L upper and lower extremities  CHEST/LUNG: Clear to percussion bilaterally; No rales, rhonchi, wheezing, or rubs  HEART: Regular rate and rhythm; 2/6 sys murmur, no rub r gallop  ABDOMEN: Soft, Nontender, mildly distended; Bowel sounds present  EXTREMITIES:  2+ Peripheral Pulses, No clubbing, cyanosis, or edema  LYMPH: No lymphadenopathy noted  SKIN: No rashes or lesions          LABS:                        12.6   8.08  )-----------( 190      ( 31 Oct 2018 07:49 )             37.7     10-31    139  |  101  |  38<H>  ----------------------------<  102<H>  4.0   |  30  |  1.20    Ca    9.8      31 Oct 2018 07:49  Mg     2.0     10-31    TPro  7.2  /  Alb  3.1<L>  /  TBili  0.6  /  DBili  x   /  AST  13  /  ALT  19  /  AlkPhos  81  10-31    PT/INR - ( 31 Oct 2018 07:49 )   PT: 19.2 sec;   INR: 1.73 ratio         PTT - ( 31 Oct 2018 07:49 )  PTT:35.0 sec      vanco through     RADIOLOGY & ADDITIONAL STUDIES:  < from: MR Lumbar Spine No Cont (10.29.18 @ 12:00) >  EXAM:  MR SPINE LUMBAR                            PROCEDURE DATE:  10/29/2018          INTERPRETATION:  History: Fall, low back pain    MRI lumbar spine direct sagittal axial imaging multiple pulse sequences.   No exogenous contrast.    Images are of diagnostic quality.  Preservation of normal lordosis. Mild acute L2 and L4 compression   deformities noted without significant retropulsion. There is associated   vertebral body marrow edema. At L2 there is a fluid-filled cleft   extending from theanterior to posterior vertebral body margin.  All discs are desiccated. Marked narrowing L4-L5 disc.   Individual levels as follows:  T12-L1: Unremarkable.  L1/L2: No significant disc bulge, canal or foraminal compromise.  L2/L3: Mild broad-based annular bulge without significant foraminal   compromise. Disc bulge combines with facet arthropathy ligamentum flavum   redundancy resulting in moderate to severe central canal stenosis.  L3/L4: Mild broad-based annular bulge resulting in moderate bilateral   foraminal stenosis. Moderate bilateral facet arthropathy and ligamentum   flavum redundancy combines with disc disease resulting in moderate   central canal stenosis.  L4-L5: Moderate broad-based annular bulge with superimposed posterior   annular tear. Secondary moderate to severe bilateral foraminal stenosis.   Marked bilateral facet arthropathy ligamentum flavum redundancy combines   with disc disease resulting in severe central canal stenosis.  L5/S1: No significant disc bulge canal or foraminal compromise. Moderate   bilateral facet arthropathy.  The conus terminates at L1.  Paraspinal soft tissues unremarkable.    Impression:    Mild acute L2 and L4 compression deformities without significant   retropulsion.  Multilevel degenerative change as described with secondary multilevel   canal and foraminal compromise.                GOLDY UMAÑA M.D., ATTENDING RADIOLOGIST  This document has been electronically signed. Oct 29 2018  1:51PM        < end of copied text >      CRITICAL CARE TIME SPENT:
Patient is a 83y old  Female who presents with a chief complaint of Fall and back pain (26 Oct 2018 13:15)    HPI:  83 years old female with past medical history of Atrial Fibrillation, Severe spinal stenosis, herniated disc in back, Mitral valve replacement, Old compression fracture of lumbar spine, who fell backwards on her buttocks while picking up a box in her garage. Patient developed back pain and was brought in to ER.  She was given pain medications and was tried to be ambulated. Patient couldn't ambulate and was admitted for pain control and further management.     patient continues to c/o back pain. Denies any numbness or weakness. No fever or chills. No bowel or bladder control issuers. No nausea or vomiting. No LOC. No head injury. (26 Oct 2018 13:15)    INTERVAL HPI/OVERNIGHT EVENTS:  Chart reviewed, notes reviewed.   Patient seen and examined.    10/27/18--> Patient is feeling slightly better.  Pain is slightly better controlled with medication.  Still having pain on getting up and moving.  Denies any chest pain or chest pressure.  Denies any nausea or vomiting.    10/28/18 --> Feeling OK. C/O lower abdominal discomfort and pain. Denies any hip pain. C/O back pain. No chest pain or pressure. No nausea or vomiting. No fever or chills. C/O constipation.     REVIEW OF SYSTEMS:    CONSTITUTIONAL: No fever, weight loss, or fatigue  	EYES: No eye pain, visual disturbances, or discharge  	ENMT:  No difficulty hearing, tinnitus, vertigo; No sinus or throat pain  	NECK: No pain or stiffness  	BREASTS: No pain, masses, or nipple discharge  	RESPIRATORY: No cough, wheezing, chills or hemoptysis; No shortness of breath  	CARDIOVASCULAR: No chest pain, palpitations, dizziness, or leg swelling  	GASTROINTESTINAL: + Lower abdominal pain. + Constipation.   	GENITOURINARY: No dysuria, frequency, hematuria, or incontinence  	NEUROLOGICAL: No headaches, memory loss, loss of strength, numbness, or tremors  	SKIN: No itching, burning, rashes, or lesions   	LYMPH NODES: No enlarged glands  	ENDOCRINE: No heat or cold intolerance; No hair loss; No polydipsia or polyuria  	MUSCULOSKELETAL: + back pain  	PSYCHIATRIC: No depression, anxiety, mood swings, or difficulty sleeping  	HEME/LYMPH: No easy bruising, or bleeding gums    ALLERGY AND IMMUNOLOGIC: No hives or eczema    Allergies: caffeine (Other), penicillin (Angioedema; Swelling)    Intolerances    MEDICATIONS  (STANDING):  atorvastatin 20 milliGRAM(s) Oral at bedtime  docusate sodium 100 milliGRAM(s) Oral three times a day  hydrochlorothiazide 50 milliGRAM(s) Oral daily  lidocaine   Patch 1 Patch Transdermal daily  lisinopril 10 milliGRAM(s) Oral daily  metoprolol succinate ER 25 milliGRAM(s) Oral daily  polyethylene glycol 3350 17 Gram(s) Oral two times a day  senna 2 Tablet(s) Oral at bedtime  warfarin 2.5 milliGRAM(s) Oral once    MEDICATIONS  (PRN):  acetaminophen   Tablet .. 650 milliGRAM(s) Oral every 6 hours PRN Temp greater or equal to 38.5C (101.3F), Mild Pain (1 - 3)  diazepam    Tablet 5 milliGRAM(s) Oral every 12 hours PRN muscle spasm  magnesium hydroxide Suspension 30 milliLiter(s) Oral daily PRN Constipation  traMADol 50 milliGRAM(s) Oral every 6 hours PRN Moderate Pain (4 - 6)    Vital Signs Last 24 Hrs  T(C): 36.8 (28 Oct 2018 13:22), Max: 37.4 (27 Oct 2018 17:06)  T(F): 98.3 (28 Oct 2018 13:22), Max: 99.3 (27 Oct 2018 17:06)  HR: 65 (28 Oct 2018 13:22) (63 - 69)  BP: 133/79 (28 Oct 2018 13:22) (107/63 - 133/79)  BP(mean): --  RR: 17 (28 Oct 2018 13:22) (17 - 19)  SpO2: 94% (28 Oct 2018 13:22) (86% - 94%)    PHYSICAL EXAM:  GENERAL: NAD, well-groomed, well-developed  HEAD:  Atraumatic, Normocephalic  EYES: EOMI, PERRLA, conjunctiva and sclera clear  ENMT: Moist mucous membranes, No lesions  NECK: Supple,   CHEST/LUNG: Clear to auscultation bilaterally; No rales, rhonchi, wheezing, or rubs  HEART: S1, S2, SM +  ABDOMEN: Soft, Nontender, Nondistended; Bowel sounds present  EXTREMITIES:  2+ Peripheral Pulses, No clubbing, cyanosis, or edema  MS: Tenderness in lower back. No deformity noted.    LYMPH: No lymphadenopathy noted  SKIN: No rashes or lesions  NERVOUS SYSTEM:  No focal deficit.   PSYCH:  Alert & Oriented X3, Good concentration.    LABS:              11.6   11.29 )-----------( 119      ( 27 Oct 2018 06:55 )             35.0     27 Oct 2018 06:55    137    |  100    |  26     ----------------------------<  106    4.0     |  30     |  1.13     Ca    9.1        27 Oct 2018 06:55    PT/INR - ( 28 Oct 2018 07:49 )   PT: 24.6 sec;   INR: 2.22 ratio      10-26 [id number] 5.3    10-26 Chol 141 LDL 68 HDL 61 Trig 60    Thyroid Stimulating Hormone, Serum: 1.84 uIU/mL (10-26 @ 10:16)    RADIOLOGY TEST: (IMAGES REVIEWED BY ME)    Imaging Personally Reviewed:  [ ] YES        HEALTH ISSUES - PROBLEM Dx:  Need for prophylactic measure: Need for prophylactic measure  Lumbar herniated disc: Lumbar herniated disc  Essential hypertension: Essential hypertension  Hyperlipidemia, unspecified hyperlipidemia type: Hyperlipidemia, unspecified hyperlipidemia type  Paroxysmal a-fib: Paroxysmal a-fib  Fall, initial encounter: Fall, initial encounter  Acute low back pain without sciatica, unspecified back pain laterality: Acute low back pain without sciatica, unspecified back pain laterality
Patient is a 83y old  Female who presents with a chief complaint of Fall and back pain (26 Oct 2018 13:15)    HPI:  83 years old female with past medical history of Atrial Fibrillation, Severe spinal stenosis, herniated disc in back, Mitral valve replacement, Old compression fracture of lumbar spine, who fell backwards on her buttocks while picking up a box in her garage. Patient developed back pain and was brought in to ER.  She was given pain medications and was tried to be ambulated. Patient couldn't ambulate and was admitted for pain control and further management.     patient continues to c/o back pain. Denies any numbness or weakness. No fever or chills. No bowel or bladder control issuers. No nausea or vomiting. No LOC. No head injury. (26 Oct 2018 13:15)    INTERVAL HPI/OVERNIGHT EVENTS:  Chart reviewed, notes reviewed.   Patient seen and examined.    10/27/18--> Patient is feeling slightly better.  Pain is slightly better controlled with medication.  Still having pain on getting up and moving.  Denies any chest pain or chest pressure.  Denies any nausea or vomiting.    REVIEW OF SYSTEMS:    CONSTITUTIONAL: No fever, weight loss, or fatigue  	EYES: No eye pain, visual disturbances, or discharge  	ENMT:  No difficulty hearing, tinnitus, vertigo; No sinus or throat pain  	NECK: No pain or stiffness  	BREASTS: No pain, masses, or nipple discharge  	RESPIRATORY: No cough, wheezing, chills or hemoptysis; No shortness of breath  	CARDIOVASCULAR: No chest pain, palpitations, dizziness, or leg swelling  	GASTROINTESTINAL: No abdominal or epigastric pain. No nausea, vomiting, or hematemesis; No diarrhea or constipation. No melena or hematochezia.  	GENITOURINARY: No dysuria, frequency, hematuria, or incontinence  	NEUROLOGICAL: No headaches, memory loss, loss of strength, numbness, or tremors  	SKIN: No itching, burning, rashes, or lesions   	LYMPH NODES: No enlarged glands  	ENDOCRINE: No heat or cold intolerance; No hair loss; No polydipsia or polyuria  	MUSCULOSKELETAL: + back pain  	PSYCHIATRIC: No depression, anxiety, mood swings, or difficulty sleeping  	HEME/LYMPH: No easy bruising, or bleeding gums    ALLERGY AND IMMUNOLOGIC: No hives or eczema    Allergies: caffeine (Other), penicillin (Angioedema; Swelling)    Intolerances    MEDICATIONS  (STANDING):  atorvastatin 20 milliGRAM(s) Oral at bedtime  hydrochlorothiazide 50 milliGRAM(s) Oral daily  lidocaine   Patch 1 Patch Transdermal daily  lisinopril 10 milliGRAM(s) Oral daily  metoprolol succinate ER 25 milliGRAM(s) Oral daily  warfarin 2.5 milliGRAM(s) Oral once    MEDICATIONS  (PRN):  acetaminophen   Tablet .. 650 milliGRAM(s) Oral every 6 hours PRN Temp greater or equal to 38.5C (101.3F), Mild Pain (1 - 3)  diazepam    Tablet 5 milliGRAM(s) Oral every 12 hours PRN muscle spasm  traMADol 50 milliGRAM(s) Oral every 6 hours PRN Moderate Pain (4 - 6)    Vital Signs Last 24 Hrs  T(C): 37.2 (27 Oct 2018 21:23), Max: 37.4 (27 Oct 2018 17:06)  T(F): 99 (27 Oct 2018 21:23), Max: 99.3 (27 Oct 2018 17:06)  HR: 65 (27 Oct 2018 21:23) (65 - 68)  BP: 111/66 (27 Oct 2018 21:23) (96/56 - 113/55)  BP(mean): --  RR: 19 (27 Oct 2018 21:23) (16 - 20)  SpO2: 91% (27 Oct 2018 21:23) (91% - 97%)    PHYSICAL EXAM:  GENERAL: NAD, well-groomed, well-developed  HEAD:  Atraumatic, Normocephalic  EYES: EOMI, PERRLA, conjunctiva and sclera clear  ENMT: Moist mucous membranes, No lesions  NECK: Supple,   CHEST/LUNG: Clear to auscultation bilaterally; No rales, rhonchi, wheezing, or rubs  HEART: S1, S2, SM +  ABDOMEN: Soft, Nontender, Nondistended; Bowel sounds present  EXTREMITIES:  2+ Peripheral Pulses, No clubbing, cyanosis, or edema  MS: Tenderness in lower back. No deformity noted.    LYMPH: No lymphadenopathy noted  SKIN: No rashes or lesions  NERVOUS SYSTEM:  No focal deficit.   PSYCH:  Alert & Oriented X3, Good concentration.    LABS:                         11.6   11.29 )-----------( 119      ( 27 Oct 2018 06:55 )             35.0     27 Oct 2018 06:55    137    |  100    |  26     ----------------------------<  106    4.0     |  30     |  1.13     Ca    9.1        27 Oct 2018 06:55  Phos  3.4       26 Oct 2018 06:48  Mg     2.0       26 Oct 2018 06:48    TPro  7.2    /  Alb  3.7    /  TBili  0.9    /  DBili  x      /  AST  21     /  ALT  28     /  AlkPhos  94     26 Oct 2018 00:49    PT/INR - ( 27 Oct 2018 06:55 )   PT: 28.0 sec;   INR: 2.52 ratio      10-26 [id number] 5.3    10-26 Chol 141 LDL 68 HDL 61 Trig 60    Thyroid Stimulating Hormone, Serum: 1.84 uIU/mL (10-26 @ 10:16)    RADIOLOGY TEST: (IMAGES REVIEWED BY ME)    Imaging Personally Reviewed:  [ ] YES        HEALTH ISSUES - PROBLEM Dx:  Need for prophylactic measure: Need for prophylactic measure  Lumbar herniated disc: Lumbar herniated disc  Essential hypertension: Essential hypertension  Hyperlipidemia, unspecified hyperlipidemia type: Hyperlipidemia, unspecified hyperlipidemia type  Paroxysmal a-fib: Paroxysmal a-fib  Fall, initial encounter: Fall, initial encounter  Acute low back pain without sciatica, unspecified back pain laterality: Acute low back pain without sciatica, unspecified back pain laterality
Patient is a 83y old  Female who presents with a chief complaint of Fall and back pain (26 Oct 2018 13:15)    HPI:  83 years old female with past medical history of Atrial Fibrillation, Severe spinal stenosis, herniated disc in back, Mitral valve replacement, Old compression fracture of lumbar spine, who fell backwards on her buttocks while picking up a box in her garage. Patient developed back pain and was brought in to ER.  She was given pain medications and was tried to be ambulated. Patient couldn't ambulate and was admitted for pain control and further management.     patient continues to c/o back pain. Denies any numbness or weakness. No fever or chills. No bowel or bladder control issuers. No nausea or vomiting. No LOC. No head injury. (26 Oct 2018 13:15)    INTERVAL HPI/OVERNIGHT EVENTS:  Chart reviewed, notes reviewed.   Patient seen and examined.    10/27/18--> Patient is feeling slightly better.  Pain is slightly better controlled with medication.  Still having pain on getting up and moving.  Denies any chest pain or chest pressure.  Denies any nausea or vomiting.  10/28/18 --> Feeling OK. C/O lower abdominal discomfort and pain. Denies any hip pain. C/O back pain. No chest pain or pressure. No nausea or vomiting. No fever or chills. C/O constipation.     10/29/18 --> Doing better. Back pain is improving. Moved her bowels multiple times. Denies any chest pain or pressure. No nausea or vomiting. No fever or chills.      11/01/18 --. Doing well. Refuses to go to [facility] today. Denies any chest pain or pressure. No nausea or vomiting. No fever or chills.     REVIEW OF SYSTEMS:    CONSTITUTIONAL: No fever, weight loss, or fatigue  	EYES: No eye pain, visual disturbances, or discharge  	ENMT:  No difficulty hearing, tinnitus, vertigo; No sinus or throat pain  	NECK: No pain or stiffness  	BREASTS: No pain, masses, or nipple discharge  	RESPIRATORY: No cough, wheezing, chills or hemoptysis; No shortness of breath  	CARDIOVASCULAR: No chest pain, palpitations, dizziness, or leg swelling  	GASTROINTESTINAL: + Lower abdominal pain. + Constipation.   	GENITOURINARY: No dysuria, frequency, hematuria, or incontinence  	NEUROLOGICAL: No headaches, memory loss, loss of strength, numbness, or tremors  	SKIN: No itching, burning, rashes, or lesions   	LYMPH NODES: No enlarged glands  	ENDOCRINE: No heat or cold intolerance; No hair loss; No polydipsia or polyuria  	MUSCULOSKELETAL: + back pain  	PSYCHIATRIC: No depression, anxiety, mood swings, or difficulty sleeping  	HEME/LYMPH: No easy bruising, or bleeding gums    ALLERGY AND IMMUNOLOGIC: No hives or eczema    Allergies: caffeine (Other), penicillin (Angioedema; Swelling)    Intolerances    MEDICATIONS  (STANDING):  amiodarone    Tablet 100 milliGRAM(s) Oral <User Schedule>  atorvastatin 20 milliGRAM(s) Oral at bedtime  docusate sodium 100 milliGRAM(s) Oral three times a day  hydrochlorothiazide 12.5 milliGRAM(s) Oral daily  lidocaine   Patch 1 Patch Transdermal daily  lisinopril 10 milliGRAM(s) Oral daily  metoprolol succinate ER 25 milliGRAM(s) Oral daily  polyethylene glycol 3350 17 Gram(s) Oral two times a day  senna 2 Tablet(s) Oral at bedtime    MEDICATIONS  (PRN):  acetaminophen   Tablet .. 650 milliGRAM(s) Oral every 6 hours PRN Temp greater or equal to 38.5C (101.3F), Mild Pain (1 - 3)  diazepam    Tablet 5 milliGRAM(s) Oral every 12 hours PRN muscle spasm  magnesium hydroxide Suspension 30 milliLiter(s) Oral daily PRN Constipation  traMADol 50 milliGRAM(s) Oral every 6 hours PRN Moderate Pain (4 - 6)    Vital Signs Last 24 Hrs  T(C): 36.9 (01 Nov 2018 13:39), Max: 36.9 (01 Nov 2018 13:39)  T(F): 98.5 (01 Nov 2018 13:39), Max: 98.5 (01 Nov 2018 13:39)  HR: 59 (01 Nov 2018 13:39) (57 - 72)  BP: 151/64 (01 Nov 2018 13:39) (101/66 - 151/64)  BP(mean): --  RR: 18 (01 Nov 2018 13:39) (18 - 19)  SpO2: 96% (01 Nov 2018 13:39) (96% - 99%)    PHYSICAL EXAM:  GENERAL: NAD, well-groomed, well-developed  HEAD:  Atraumatic, Normocephalic  EYES: EOMI, PERRLA, conjunctiva and sclera clear  ENMT: Moist mucous membranes, No lesions  NECK: Supple,   CHEST/LUNG: Clear to auscultation bilaterally; No rales, rhonchi, wheezing, or rubs  HEART: S1, S2, SM +  ABDOMEN: Soft, Nontender, Nondistended; Bowel sounds present  EXTREMITIES:  2+ Peripheral Pulses, No clubbing, cyanosis, or edema  MS: Tenderness in lower back. No deformity noted.    LYMPH: No lymphadenopathy noted  SKIN: No rashes or lesions  NERVOUS SYSTEM:  No focal deficit.   PSYCH:  Alert & Oriented X3, Good concentration.    LABS:                          12.6   8.08  )-----------( 190      ( 31 Oct 2018 07:49 )             37.7     31 Oct 2018 07:49    139    |  101    |  38     ----------------------------<  102    4.0     |  30     |  1.20     Ca    9.8        31 Oct 2018 07:49  Mg     2.0       31 Oct 2018 07:49    TPro  7.2    /  Alb  3.1    /  TBili  0.6    /  DBili  x      /  AST  13     /  ALT  19     /  AlkPhos  81     31 Oct 2018 07:49    PT/INR - ( 01 Nov 2018 08:05 )   PT: 26.3 sec;   INR: 2.35 ratio      PTT - ( 31 Oct 2018 07:49 )  PTT:35.0 sec  10-26 [id number] 5.3    10-26 Chol 141 LDL 68 HDL 61 Trig 60    Thyroid Stimulating Hormone, Serum: 1.84 uIU/mL (10-26 @ 10:16)    Troponin I, Serum: .000 ng/mL (10-30 @ 01:57)  Troponin I, Serum: .000 ng/mL (10-29 @ 20:21)    RADIOLOGY TEST: (IMAGES REVIEWED BY ME)  < from: MR Lumbar Spine No Cont (10.29.18 @ 12:00) >  XAM:  MR SPINE LUMBAR                            PROCEDURE DATE:  10/29/2018          INTERPRETATION:  History: Fall, low back pain    MRI lumbar spine direct sagittal axial imaging multiple pulse sequences.   No exogenous contrast.    Images are of diagnostic quality.  Preservation of normal lordosis. Mild acute L2 and L4 compression   deformities noted without significant retropulsion. There is associated   vertebral body marrow edema. At L2 there is a fluid-filled cleft   extending from theanterior to posterior vertebral body margin.  All discs are desiccated. Marked narrowing L4-L5 disc.   Individual levels as follows:  T12-L1: Unremarkable.  L1/L2: No significant disc bulge, canal or foraminal compromise.  L2/L3: Mild broad-based annular bulge without significant foraminal   compromise. Disc bulge combines with facet arthropathy ligamentum flavum   redundancy resulting in moderate to severe central canal stenosis.  L3/L4: Mild broad-based annular bulge resulting in moderate bilateral   foraminal stenosis. Moderate bilateral facet arthropathy and ligamentum   flavum redundancy combines with disc disease resulting in moderate   central canal stenosis.  L4-L5: Moderate broad-based annular bulge with superimposed posterior   annular tear. Secondary moderate to severe bilateral foraminal stenosis.   Marked bilateral facet arthropathy ligamentum flavum redundancy combines   with disc disease resulting in severe central canal stenosis.  L5/S1: No significant disc bulge canal or foraminal compromise. Moderate   bilateral facet arthropathy.  The conus terminates at L1.  Paraspinal soft tissues unremarkable.    Impression:    Mild acute L2 and L4 compression deformities without significant   retropulsion.  Multilevel degenerative change as described with secondary multilevel   canal and foraminal compromise.    < end of copied text >    < from: Xray Abdomen 2 Views (10.28.18 @ 17:23) >  EXAM:  XR ABDOMEN 2V                        PROCEDURE DATE:  10/28/2018    INTERPRETATION:  XR ABDOMEN 2V  HISTORY: Generalized Abdominal pain status post fall    VIEWS:  Supine and erect        COMPARISON:  None    No dilated small bowel loops or air-fluid levels are seen.    Markedly redundant, air-filled colon with severe fecal retention in the   ascending/transverse and moderate retention in the rectum.    No mass effect or organomegaly.    No pathologic intra-abdominal calcifications.    No free air under the diaphragm.    IMPRESSION:    Nonobstructive bowel gas pattern.    Markedly redundant, air-filled colon with severe fecal retention in the   ascending/transverse and moderate retention in the rectum.    < end of copied text >    Imaging Personally Reviewed:  [X] YES        HEALTH ISSUES - PROBLEM Dx:  Need for prophylactic measure: Need for prophylactic measure  Lumbar herniated disc: Lumbar herniated disc  Essential hypertension: Essential hypertension  Hyperlipidemia, unspecified hyperlipidemia type: Hyperlipidemia, unspecified hyperlipidemia type  Paroxysmal a-fib: Paroxysmal a-fib  Fall, initial encounter: Fall, initial encounter  Acute low back pain without sciatica, unspecified back pain laterality: Acute low back pain without sciatica, unspecified back pain laterality
Patient is a 83y old  Female who presents with a chief complaint of Fall and back pain (26 Oct 2018 13:15)    HPI:  83 years old female with past medical history of Atrial Fibrillation, Severe spinal stenosis, herniated disc in back, Mitral valve replacement, Old compression fracture of lumbar spine, who fell backwards on her buttocks while picking up a box in her garage. Patient developed back pain and was brought in to ER.  She was given pain medications and was tried to be ambulated. Patient couldn't ambulate and was admitted for pain control and further management.     patient continues to c/o back pain. Denies any numbness or weakness. No fever or chills. No bowel or bladder control issuers. No nausea or vomiting. No LOC. No head injury. (26 Oct 2018 13:15)    INTERVAL HPI/OVERNIGHT EVENTS:  Chart reviewed, notes reviewed.   Patient seen and examined.    10/27/18--> Patient is feeling slightly better.  Pain is slightly better controlled with medication.  Still having pain on getting up and moving.  Denies any chest pain or chest pressure.  Denies any nausea or vomiting.  10/28/18 --> Feeling OK. C/O lower abdominal discomfort and pain. Denies any hip pain. C/O back pain. No chest pain or pressure. No nausea or vomiting. No fever or chills. C/O constipation.     10/29/18 --> Doing better. Back pain is improving. Moved her bowels multiple times. Denies any chest pain or pressure. No nausea or vomiting. No fever or chills.      REVIEW OF SYSTEMS:    CONSTITUTIONAL: No fever, weight loss, or fatigue  	EYES: No eye pain, visual disturbances, or discharge  	ENMT:  No difficulty hearing, tinnitus, vertigo; No sinus or throat pain  	NECK: No pain or stiffness  	BREASTS: No pain, masses, or nipple discharge  	RESPIRATORY: No cough, wheezing, chills or hemoptysis; No shortness of breath  	CARDIOVASCULAR: No chest pain, palpitations, dizziness, or leg swelling  	GASTROINTESTINAL: + Lower abdominal pain. + Constipation.   	GENITOURINARY: No dysuria, frequency, hematuria, or incontinence  	NEUROLOGICAL: No headaches, memory loss, loss of strength, numbness, or tremors  	SKIN: No itching, burning, rashes, or lesions   	LYMPH NODES: No enlarged glands  	ENDOCRINE: No heat or cold intolerance; No hair loss; No polydipsia or polyuria  	MUSCULOSKELETAL: + back pain  	PSYCHIATRIC: No depression, anxiety, mood swings, or difficulty sleeping  	HEME/LYMPH: No easy bruising, or bleeding gums    ALLERGY AND IMMUNOLOGIC: No hives or eczema    Allergies: caffeine (Other), penicillin (Angioedema; Swelling)    Intolerances    MEDICATIONS  (STANDING):  amiodarone    Tablet 100 milliGRAM(s) Oral <User Schedule>  atorvastatin 20 milliGRAM(s) Oral at bedtime  docusate sodium 100 milliGRAM(s) Oral three times a day  hydrochlorothiazide 50 milliGRAM(s) Oral daily  lidocaine   Patch 1 Patch Transdermal daily  lisinopril 10 milliGRAM(s) Oral daily  metoprolol succinate ER 25 milliGRAM(s) Oral daily  polyethylene glycol 3350 17 Gram(s) Oral two times a day  senna 2 Tablet(s) Oral at bedtime  warfarin 2.5 milliGRAM(s) Oral once    MEDICATIONS  (PRN):  acetaminophen   Tablet .. 650 milliGRAM(s) Oral every 6 hours PRN Temp greater or equal to 38.5C (101.3F), Mild Pain (1 - 3)  diazepam    Tablet 5 milliGRAM(s) Oral every 12 hours PRN muscle spasm  magnesium hydroxide Suspension 30 milliLiter(s) Oral daily PRN Constipation  traMADol 50 milliGRAM(s) Oral every 6 hours PRN Moderate Pain (4 - 6)    ICU Vital Signs Last 24 Hrs  T(C): 36.7 (29 Oct 2018 15:20), Max: 37.1 (28 Oct 2018 23:18)  T(F): 98 (29 Oct 2018 15:20), Max: 98.8 (28 Oct 2018 23:18)  HR: 67 (29 Oct 2018 15:20) (63 - 72)  BP: 89/55 (29 Oct 2018 15:20) (89/55 - 125/74)  BP(mean): --  ABP: --  ABP(mean): --  RR: 18 (29 Oct 2018 15:20) (14 - 19)  SpO2: 94% (29 Oct 2018 15:20) (92% - 94%)    PHYSICAL EXAM:  GENERAL: NAD, well-groomed, well-developed  HEAD:  Atraumatic, Normocephalic  EYES: EOMI, PERRLA, conjunctiva and sclera clear  ENMT: Moist mucous membranes, No lesions  NECK: Supple,   CHEST/LUNG: Clear to auscultation bilaterally; No rales, rhonchi, wheezing, or rubs  HEART: S1, S2, SM +  ABDOMEN: Soft, Nontender, Nondistended; Bowel sounds present  EXTREMITIES:  2+ Peripheral Pulses, No clubbing, cyanosis, or edema  MS: Tenderness in lower back. No deformity noted.    LYMPH: No lymphadenopathy noted  SKIN: No rashes or lesions  NERVOUS SYSTEM:  No focal deficit.   PSYCH:  Alert & Oriented X3, Good concentration.    LABS:              12.0   10.46 )-----------( 147      ( 29 Oct 2018 07:46 )             35.3     29 Oct 2018 07:46    137    |  99     |  42     ----------------------------<  125    3.9     |  29     |  1.28     Ca    9.4        29 Oct 2018 07:46    PT/INR - ( 29 Oct 2018 07:46 )   PT: 23.9 sec;   INR: 2.16 ratio      10-26 [id number] 5.3    10-26 Chol 141 LDL 68 HDL 61 Trig 60    Thyroid Stimulating Hormone, Serum: 1.84 uIU/mL (10-26 @ 10:16)    RADIOLOGY TEST: (IMAGES REVIEWED BY ME)  < from: MR Lumbar Spine No Cont (10.29.18 @ 12:00) >  XAM:  MR SPINE LUMBAR                            PROCEDURE DATE:  10/29/2018          INTERPRETATION:  History: Fall, low back pain    MRI lumbar spine direct sagittal axial imaging multiple pulse sequences.   No exogenous contrast.    Images are of diagnostic quality.  Preservation of normal lordosis. Mild acute L2 and L4 compression   deformities noted without significant retropulsion. There is associated   vertebral body marrow edema. At L2 there is a fluid-filled cleft   extending from theanterior to posterior vertebral body margin.  All discs are desiccated. Marked narrowing L4-L5 disc.   Individual levels as follows:  T12-L1: Unremarkable.  L1/L2: No significant disc bulge, canal or foraminal compromise.  L2/L3: Mild broad-based annular bulge without significant foraminal   compromise. Disc bulge combines with facet arthropathy ligamentum flavum   redundancy resulting in moderate to severe central canal stenosis.  L3/L4: Mild broad-based annular bulge resulting in moderate bilateral   foraminal stenosis. Moderate bilateral facet arthropathy and ligamentum   flavum redundancy combines with disc disease resulting in moderate   central canal stenosis.  L4-L5: Moderate broad-based annular bulge with superimposed posterior   annular tear. Secondary moderate to severe bilateral foraminal stenosis.   Marked bilateral facet arthropathy ligamentum flavum redundancy combines   with disc disease resulting in severe central canal stenosis.  L5/S1: No significant disc bulge canal or foraminal compromise. Moderate   bilateral facet arthropathy.  The conus terminates at L1.  Paraspinal soft tissues unremarkable.    Impression:    Mild acute L2 and L4 compression deformities without significant   retropulsion.  Multilevel degenerative change as described with secondary multilevel   canal and foraminal compromise.    < end of copied text >    < from: Xray Abdomen 2 Views (10.28.18 @ 17:23) >  EXAM:  XR ABDOMEN 2V                        PROCEDURE DATE:  10/28/2018    INTERPRETATION:  XR ABDOMEN 2V  HISTORY: Generalized Abdominal pain status post fall    VIEWS:  Supine and erect        COMPARISON:  None    No dilated small bowel loops or air-fluid levels are seen.    Markedly redundant, air-filled colon with severe fecal retention in the   ascending/transverse and moderate retention in the rectum.    No mass effect or organomegaly.    No pathologic intra-abdominal calcifications.    No free air under the diaphragm.    IMPRESSION:    Nonobstructive bowel gas pattern.    Markedly redundant, air-filled colon with severe fecal retention in the   ascending/transverse and moderate retention in the rectum.    < end of copied text >    Imaging Personally Reviewed:  [X] YES        HEALTH ISSUES - PROBLEM Dx:  Need for prophylactic measure: Need for prophylactic measure  Lumbar herniated disc: Lumbar herniated disc  Essential hypertension: Essential hypertension  Hyperlipidemia, unspecified hyperlipidemia type: Hyperlipidemia, unspecified hyperlipidemia type  Paroxysmal a-fib: Paroxysmal a-fib  Fall, initial encounter: Fall, initial encounter  Acute low back pain without sciatica, unspecified back pain laterality: Acute low back pain without sciatica, unspecified back pain laterality
Patient is a 83y old  Female who presents with a chief complaint of Fall and back pain (26 Oct 2018 13:15)    HPI:  83 years old female with past medical history of Atrial Fibrillation, Severe spinal stenosis, herniated disc in back, Mitral valve replacement, Old compression fracture of lumbar spine, who fell backwards on her buttocks while picking up a box in her garage. Patient developed back pain and was brought in to ER.  She was given pain medications and was tried to be ambulated. Patient couldn't ambulate and was admitted for pain control and further management.     patient continues to c/o back pain. Denies any numbness or weakness. No fever or chills. No bowel or bladder control issuers. No nausea or vomiting. No LOC. No head injury. (26 Oct 2018 13:15)    INTERVAL HPI/OVERNIGHT EVENTS:  Chart reviewed, notes reviewed.   Patient seen and examined.    10/27/18--> Patient is feeling slightly better.  Pain is slightly better controlled with medication.  Still having pain on getting up and moving.  Denies any chest pain or chest pressure.  Denies any nausea or vomiting.  10/28/18 --> Feeling OK. C/O lower abdominal discomfort and pain. Denies any hip pain. C/O back pain. No chest pain or pressure. No nausea or vomiting. No fever or chills. C/O constipation.     10/29/18 --> Doing better. Back pain is improving. Moved her bowels multiple times. Denies any chest pain or pressure. No nausea or vomiting. No fever or chills.    11/01/18 --. Doing well. Refuses to go to [facility] today. Denies any chest pain or pressure. No nausea or vomiting. No fever or chills.     11/02/18 --> Doing well. No new complaints. No chest pain or pressure.     REVIEW OF SYSTEMS:    CONSTITUTIONAL: No fever, weight loss, or fatigue  	EYES: No eye pain, visual disturbances, or discharge  	ENMT:  No difficulty hearing, tinnitus, vertigo; No sinus or throat pain  	NECK: No pain or stiffness  	BREASTS: No pain, masses, or nipple discharge  	RESPIRATORY: No cough, wheezing, chills or hemoptysis; No shortness of breath  	CARDIOVASCULAR: No chest pain, palpitations, dizziness, or leg swelling  	GASTROINTESTINAL: + Lower abdominal pain. + Constipation.   	GENITOURINARY: No dysuria, frequency, hematuria, or incontinence  	NEUROLOGICAL: No headaches, memory loss, loss of strength, numbness, or tremors  	SKIN: No itching, burning, rashes, or lesions   	LYMPH NODES: No enlarged glands  	ENDOCRINE: No heat or cold intolerance; No hair loss; No polydipsia or polyuria  	MUSCULOSKELETAL: + back pain  	PSYCHIATRIC: No depression, anxiety, mood swings, or difficulty sleeping  	HEME/LYMPH: No easy bruising, or bleeding gums    ALLERGY AND IMMUNOLOGIC: No hives or eczema    Allergies: caffeine (Other), penicillin (Angioedema; Swelling)    Intolerances    MEDICATIONS  (STANDING):  amiodarone    Tablet 100 milliGRAM(s) Oral <User Schedule>  atorvastatin 20 milliGRAM(s) Oral at bedtime  docusate sodium 100 milliGRAM(s) Oral three times a day  hydrochlorothiazide 12.5 milliGRAM(s) Oral daily  lidocaine   Patch 1 Patch Transdermal daily  lisinopril 10 milliGRAM(s) Oral daily  metoprolol succinate ER 25 milliGRAM(s) Oral daily  polyethylene glycol 3350 17 Gram(s) Oral two times a day  senna 2 Tablet(s) Oral at bedtime    MEDICATIONS  (PRN):  acetaminophen   Tablet .. 650 milliGRAM(s) Oral every 6 hours PRN Temp greater or equal to 38.5C (101.3F), Mild Pain (1 - 3)  magnesium hydroxide Suspension 30 milliLiter(s) Oral daily PRN Constipation    Vital Signs Last 24 Hrs  T(C): 36.8 (02 Nov 2018 08:34), Max: 36.9 (01 Nov 2018 13:39)  T(F): 98.2 (02 Nov 2018 08:34), Max: 98.5 (01 Nov 2018 13:39)  HR: 63 (02 Nov 2018 08:34) (59 - 71)  BP: 120/63 (02 Nov 2018 08:34) (111/59 - 151/64)  BP(mean): --  RR: 18 (02 Nov 2018 08:34) (17 - 18)  SpO2: 99% (02 Nov 2018 08:34) (94% - 100%)    PHYSICAL EXAM:  GENERAL: NAD, well-groomed, well-developed  HEAD:  Atraumatic, Normocephalic  EYES: EOMI, PERRLA, conjunctiva and sclera clear  ENMT: Moist mucous membranes, No lesions  NECK: Supple,   CHEST/LUNG: Clear to auscultation bilaterally; No rales, rhonchi, wheezing, or rubs  HEART: S1, S2, SM +  ABDOMEN: Soft, Nontender, Nondistended; Bowel sounds present  EXTREMITIES:  2+ Peripheral Pulses, No clubbing, cyanosis, or edema  MS: Tenderness in lower back. No deformity noted.    LYMPH: No lymphadenopathy noted  SKIN: No rashes or lesions  NERVOUS SYSTEM:  No focal deficit.   PSYCH:  Alert & Oriented X3, Good concentration.    LABS:   PT/INR - ( 02 Nov 2018 07:40 )   PT: 27.9 sec;   INR: 2.49 ratio      10-26 [id number] 5.3    10-26 Chol 141 LDL 68 HDL 61 Trig 60    Thyroid Stimulating Hormone, Serum: 1.84 uIU/mL (10-26 @ 10:16)    Troponin I, Serum: .000 ng/mL (10-30 @ 01:57)  Troponin I, Serum: .000 ng/mL (10-29 @ 20:21)      RADIOLOGY TEST: (IMAGES REVIEWED BY ME)  < from: MR Lumbar Spine No Cont (10.29.18 @ 12:00) >  XAM:  MR SPINE LUMBAR                            PROCEDURE DATE:  10/29/2018          INTERPRETATION:  History: Fall, low back pain    MRI lumbar spine direct sagittal axial imaging multiple pulse sequences.   No exogenous contrast.    Images are of diagnostic quality.  Preservation of normal lordosis. Mild acute L2 and L4 compression   deformities noted without significant retropulsion. There is associated   vertebral body marrow edema. At L2 there is a fluid-filled cleft   extending from theanterior to posterior vertebral body margin.  All discs are desiccated. Marked narrowing L4-L5 disc.   Individual levels as follows:  T12-L1: Unremarkable.  L1/L2: No significant disc bulge, canal or foraminal compromise.  L2/L3: Mild broad-based annular bulge without significant foraminal   compromise. Disc bulge combines with facet arthropathy ligamentum flavum   redundancy resulting in moderate to severe central canal stenosis.  L3/L4: Mild broad-based annular bulge resulting in moderate bilateral   foraminal stenosis. Moderate bilateral facet arthropathy and ligamentum   flavum redundancy combines with disc disease resulting in moderate   central canal stenosis.  L4-L5: Moderate broad-based annular bulge with superimposed posterior   annular tear. Secondary moderate to severe bilateral foraminal stenosis.   Marked bilateral facet arthropathy ligamentum flavum redundancy combines   with disc disease resulting in severe central canal stenosis.  L5/S1: No significant disc bulge canal or foraminal compromise. Moderate   bilateral facet arthropathy.  The conus terminates at L1.  Paraspinal soft tissues unremarkable.    Impression:    Mild acute L2 and L4 compression deformities without significant   retropulsion.  Multilevel degenerative change as described with secondary multilevel   canal and foraminal compromise.    < end of copied text >    < from: Xray Abdomen 2 Views (10.28.18 @ 17:23) >  EXAM:  XR ABDOMEN 2V                        PROCEDURE DATE:  10/28/2018    INTERPRETATION:  XR ABDOMEN 2V  HISTORY: Generalized Abdominal pain status post fall    VIEWS:  Supine and erect        COMPARISON:  None    No dilated small bowel loops or air-fluid levels are seen.    Markedly redundant, air-filled colon with severe fecal retention in the   ascending/transverse and moderate retention in the rectum.    No mass effect or organomegaly.    No pathologic intra-abdominal calcifications.    No free air under the diaphragm.    IMPRESSION:    Nonobstructive bowel gas pattern.    Markedly redundant, air-filled colon with severe fecal retention in the   ascending/transverse and moderate retention in the rectum.    < end of copied text >    Imaging Personally Reviewed:  [X] YES        HEALTH ISSUES - PROBLEM Dx:  Need for prophylactic measure: Need for prophylactic measure  Lumbar herniated disc: Lumbar herniated disc  Essential hypertension: Essential hypertension  Hyperlipidemia, unspecified hyperlipidemia type: Hyperlipidemia, unspecified hyperlipidemia type  Paroxysmal a-fib: Paroxysmal a-fib  Fall, initial encounter: Fall, initial encounter  Acute low back pain without sciatica, unspecified back pain laterality: Acute low back pain without sciatica, unspecified back pain laterality
Patient is a 83y old  Female who presents with a chief complaint of Fall and back pain (31 Oct 2018 09:07)      INTERVAL HPI/OVERNIGHT EVENTS: Patient seen and examined. NAD. Still c/o back pain.    Vital Signs Last 24 Hrs  T(C): 36.8 (31 Oct 2018 13:20), Max: 36.8 (30 Oct 2018 22:23)  T(F): 98.2 (31 Oct 2018 13:20), Max: 98.2 (30 Oct 2018 22:23)  HR: 69 (31 Oct 2018 13:20) (61 - 75)  BP: 113/70 (31 Oct 2018 13:20) (94/46 - 150/68)  BP(mean): 61 (30 Oct 2018 21:00) (61 - 96)  RR: 18 (31 Oct 2018 13:20) (15 - 21)  SpO2: 96% (31 Oct 2018 13:20) (90% - 99%)    10-31    139  |  101  |  38<H>  ----------------------------<  102<H>  4.0   |  30  |  1.20    Ca    9.8      31 Oct 2018 07:49  Mg     2.0     10-31    TPro  7.2  /  Alb  3.1<L>  /  TBili  0.6  /  DBili  x   /  AST  13  /  ALT  19  /  AlkPhos  81  10-31                          12.6   8.08  )-----------( 190      ( 31 Oct 2018 07:49 )             37.7     PT/INR - ( 31 Oct 2018 07:49 )   PT: 19.2 sec;   INR: 1.73 ratio         PTT - ( 31 Oct 2018 07:49 )  PTT:35.0 sec  CAPILLARY BLOOD GLUCOSE                  acetaminophen   Tablet .. 650 milliGRAM(s) Oral every 6 hours PRN  amiodarone    Tablet 100 milliGRAM(s) Oral <User Schedule>  atorvastatin 20 milliGRAM(s) Oral at bedtime  diazepam    Tablet 5 milliGRAM(s) Oral every 12 hours PRN  docusate sodium 100 milliGRAM(s) Oral three times a day  hydrochlorothiazide 12.5 milliGRAM(s) Oral daily  lidocaine   Patch 1 Patch Transdermal daily  lisinopril 10 milliGRAM(s) Oral daily  magnesium hydroxide Suspension 30 milliLiter(s) Oral daily PRN  metoprolol succinate ER 25 milliGRAM(s) Oral daily  polyethylene glycol 3350 17 Gram(s) Oral two times a day  senna 2 Tablet(s) Oral at bedtime  traMADol 50 milliGRAM(s) Oral every 6 hours PRN              REVIEW OF SYSTEMS:  CONSTITUTIONAL: No fever, no weight loss, or no fatigue  NECK: No pain, no stiffness  RESPIRATORY: No cough, no wheezing, no chills, no hemoptysis, No shortness of breath  CARDIOVASCULAR: No chest pain, no palpitations, no dizziness, no leg swelling  GASTROINTESTINAL: No abdominal pain. No nausea, no vomiting, no hematemesis; No diarrhea, no constipation. No melena, no hematochezia.  GENITOURINARY: No dysuria, no frequency, no hematuria, no incontinence  NEUROLOGICAL: No headaches, no loss of strength, no numbness, no tremors  SKIN: No itching, no burning  MUSCULOSKELETAL: No joint pain, no swelling; No muscle, + back pain, no extremity pain  PSYCHIATRIC: No depression, no mood swings,   HEME/LYMPH: No easy bruising, no bleeding gums  ALLERY AND IMMUNOLOGIC: No hives       Consultant(s) Notes Reviewed:  [X] YES  [ ] NO    PHYSICAL EXAM:  GENERAL: NAD  HEAD:  Atraumatic, Normocephalic  EYES: EOMI, PERRLA, conjunctiva and sclera clear  ENMT: No tonsillar erythema, exudates, or enlargement; Moist mucous membranes  NECK: Supple, No JVD  NERVOUS SYSTEM:  Awake & alert  CHEST/LUNG: Clear to auscultation bilaterally; No rales, rhonchi, wheezing,  HEART: Regular rate and rhythm  ABDOMEN: Soft, Nontender, Nondistended; Bowel sounds present  EXTREMITIES:  No clubbing, cyanosis, or edema  LYMPH: No lymphadenopathy noted  SKIN: No rashes      Advanced care planning discussed with patient/family [X] YES   [ ] NO    Advanced care planning discussed with patient/family. Advanced care planning forms reviewed/discussed/completed. 20 minutes spent.
REASON FOR VISIT: PAF    HPI:  83 years old woman with Paroxysmal Atrial Fibrillation, Severe spinal stenosis, herniated disc in back, bioprosthetic aortic valve replacement, past compression fracture of lumbar spine now admitted 10/25/18 with back pain s/p fall; experienced a transient episode of AF with RVR.    10/31/18:  Persistent back pain; no cardiac symptoms; requests reinitiation of diuretic.  11/1/18  Patient is having severe back pain , no other complaints , remained in sinus rhythm       MEDICATIONS  (STANDING):  amiodarone    Tablet 100 milliGRAM(s) Oral <User Schedule>  atorvastatin 20 milliGRAM(s) Oral at bedtime  docusate sodium 100 milliGRAM(s) Oral three times a day  hydrochlorothiazide 12.5 milliGRAM(s) Oral daily  lidocaine   Patch 1 Patch Transdermal daily  lisinopril 10 milliGRAM(s) Oral daily  metoprolol succinate ER 25 milliGRAM(s) Oral daily  polyethylene glycol 3350 17 Gram(s) Oral two times a day  senna 2 Tablet(s) Oral at bedtime    MEDICATIONS  (PRN):  acetaminophen   Tablet .. 650 milliGRAM(s) Oral every 6 hours PRN Temp greater or equal to 38.5C (101.3F), Mild Pain (1 - 3)  diazepam    Tablet 5 milliGRAM(s) Oral every 12 hours PRN muscle spasm  magnesium hydroxide Suspension 30 milliLiter(s) Oral daily PRN Constipation  traMADol 50 milliGRAM(s) Oral every 6 hours PRN Moderate Pain (4 - 6)    Vital Signs Last 24 Hrs  T(C): 36.4 (01 Nov 2018 05:13), Max: 36.8 (31 Oct 2018 13:20)  T(F): 97.6 (01 Nov 2018 05:13), Max: 98.3 (31 Oct 2018 21:13)  HR: 62 (01 Nov 2018 05:13) (62 - 72)  BP: 104/66 (01 Nov 2018 05:13) (101/66 - 113/70)  BP(mean): --  RR: 18 (01 Nov 2018 05:13) (18 - 18)  SpO2: 97% (01 Nov 2018 05:13) (96% - 99%)    I&O's Summary    31 Oct 2018 07:01  -  01 Nov 2018 07:00  --------------------------------------------------------  IN: 730 mL / OUT: 301 mL / NET: 429 mL      PHYSICAL EXAM:  Constitutional: NAD, awake and alert  Neck:  supple,  No JVD  Respiratory: Breath sounds are clear bilaterally, No wheezing, rales or rhonchi  Cardiovascular: S1 and S2, regular rate and rhythm, systolic murmur  Gastrointestinal: Bowel Sounds present, soft, nontender.   Extremities: No peripheral edema.   Skin: No rashes.    LABS:                        12.6   8.08  )-----------( 190      ( 31 Oct 2018 07:49 )             37.7     10-31    139  |  101  |  38<H>  ----------------------------<  102<H>  4.0   |  30  |  1.20    Ca    9.8      31 Oct 2018 07:49  Mg     2.0     10-31    TPro  7.2  /  Alb  3.1<L>  /  TBili  0.6  /  DBili  x   /  AST  13  /  ALT  19  /  AlkPhos  81  10-31        LIVER FUNCTIONS - ( 31 Oct 2018 07:49 )  Alb: 3.1 g/dL / Pro: 7.2 g/dL / ALK PHOS: 81 U/L / ALT: 19 U/L DA / AST: 13 U/L / GGT: x           PT/INR - ( 01 Nov 2018 08:05 )   PT: 26.3 sec;   INR: 2.35 ratio         PTT - ( 31 Oct 2018 07:49 )  PTT:35.0 sec    10-26 Chol 141 LDL 68 HDL 61 Trig 60  Transthoracic Echocardiogram (09.17.18 @ 10:33):  1. Bioprosthetic aortic valve. Peak transaortic valve gradient equals 25 mm Hg, mean transaortic valve gradient equals 12 mm Hg, which is probably normal in the presence of a bioprosthetic aortic valve. Mild aortic regurgitation.  2. Severely dilated left atrium.    3. Normal left ventricular internal dimensions and wall thicknesses.  4. Normal left ventricular systolic function. No segmental wall motion abnormalities.  5. Estimated pulmonary artery systolic pressure equals 44 mm Hg, assuming right atrial pressure equals 8 mm Hg, consistent with mild pulmonary pressures.    Tele: Sinus rhythm
REASON FOR VISIT: PAF    HPI:  83 years old woman with Paroxysmal Atrial Fibrillation, Severe spinal stenosis, herniated disc in back, bioprosthetic aortic valve replacement, past compression fracture of lumbar spine now admitted 10/25/18 with back pain s/p fall; experienced a transient episode of AF with RVR.    10/31/18:  Persistent back pain; no cardiac symptoms; requests reinitiation of diuretic.    MEDICATIONS  (STANDING):  amiodarone    Tablet 100 milliGRAM(s) Oral <User Schedule>  atorvastatin 20 milliGRAM(s) Oral at bedtime  docusate sodium 100 milliGRAM(s) Oral three times a day  lidocaine   Patch 1 Patch Transdermal daily  lisinopril 10 milliGRAM(s) Oral daily  metoprolol succinate ER 25 milliGRAM(s) Oral daily  polyethylene glycol 3350 17 Gram(s) Oral two times a day  senna 2 Tablet(s) Oral at bedtime    MEDICATIONS  (PRN):  acetaminophen   Tablet .. 650 milliGRAM(s) Oral every 6 hours PRN Temp greater or equal to 38.5C (101.3F), Mild Pain (1 - 3)  diazepam    Tablet 5 milliGRAM(s) Oral every 12 hours PRN muscle spasm  magnesium hydroxide Suspension 30 milliLiter(s) Oral daily PRN Constipation  traMADol 50 milliGRAM(s) Oral every 6 hours PRN Moderate Pain (4 - 6)    Vital Signs Last 24 Hrs  T(C): 36.4 (31 Oct 2018 08:45), Max: 36.8 (30 Oct 2018 12:36)  T(F): 97.6 (31 Oct 2018 08:45), Max: 98.2 (30 Oct 2018 12:36)  HR: 67 (31 Oct 2018 08:45) (61 - 75)  BP: 112/66 (31 Oct 2018 08:45) (94/46 - 150/68)  BP(mean): 61 (30 Oct 2018 21:00) (61 - 96)  RR: 18 (31 Oct 2018 08:45) (12 - 21)  SpO2: 94% (31 Oct 2018 08:45) (90% - 100%)    PHYSICAL EXAM:  Constitutional: NAD, awake and alert  Neck:  supple,  No JVD  Respiratory: Breath sounds are clear bilaterally, No wheezing, rales or rhonchi  Cardiovascular: S1 and S2, regular rate and rhythm, systolic murmur  Gastrointestinal: Bowel Sounds present, soft, nontender.   Extremities: No peripheral edema.   Skin: No rashes.    LABS:  CARDIAC MARKERS ( 30 Oct 2018 01:57 ) .000 ng/mL / x     / x     / x     / x      CARDIAC MARKERS ( 29 Oct 2018 20:21 ) .000 ng/mL / x     / x     / x     / x                          12.6   8.08  )-----------( 190      ( 31 Oct 2018 07:49 )             37.7     139  |  101  |  38<H>  ----------------------------<  102<H>  4.0   |  30  |  1.20    Ca    9.8      31 Oct 2018 07:49  Mg     2.0     10-31    TPro  7.2  /  Alb  3.1<L>  /  TBili  0.6  /  DBili  x   /  AST  13  /  ALT  19  /  AlkPhos  81  10-31    Transthoracic Echocardiogram (09.17.18 @ 10:33):  1. Bioprosthetic aortic valve. Peak transaortic valve gradient equals 25 mm Hg, mean transaortic valve gradient equals 12 mm Hg, which is probably normal in the presence of a bioprosthetic aortic valve. Mild aortic regurgitation.  2. Severely dilated left atrium.    3. Normal left ventricular internal dimensions and wall thicknesses.  4. Normal left ventricular systolic function. No segmental wall motion abnormalities.  5. Estimated pulmonary artery systolic pressure equals 44 mm Hg, assuming right atrial pressure equals 8 mm Hg, consistent with mild pulmonary pressures.    Tele: Sinus rhythm

## 2018-11-02 NOTE — PROGRESS NOTE ADULT - PROBLEM SELECTOR PLAN 6
Continue pain medications as needed.
cardio fu
Continue pain medications as needed.

## 2018-11-02 NOTE — PROGRESS NOTE ADULT - PROBLEM SELECTOR PROBLEM 4
Fall, initial encounter
Hyperlipidemia, unspecified hyperlipidemia type

## 2018-11-02 NOTE — PROGRESS NOTE ADULT - PROBLEM SELECTOR PLAN 7
Patient is therapeutically anticoagulated on Coumadin.  No additional prophylaxis is needed.

## 2018-11-02 NOTE — PROGRESS NOTE ADULT - NSHPATTENDINGPLANDISCUSS_GEN_ALL_CORE
Patient/spine surgery
patient
patient and son on phone.
nursing in detail
patient
patient and message left for patient's son.
patient and nursing.
patient and son on phone.

## 2018-11-11 PROCEDURE — 84436 ASSAY OF TOTAL THYROXINE: CPT

## 2018-11-11 PROCEDURE — 71045 X-RAY EXAM CHEST 1 VIEW: CPT

## 2018-11-11 PROCEDURE — 80053 COMPREHEN METABOLIC PANEL: CPT

## 2018-11-11 PROCEDURE — 80061 LIPID PANEL: CPT

## 2018-11-11 PROCEDURE — 73522 X-RAY EXAM HIPS BI 3-4 VIEWS: CPT

## 2018-11-11 PROCEDURE — 83735 ASSAY OF MAGNESIUM: CPT

## 2018-11-11 PROCEDURE — 83036 HEMOGLOBIN GLYCOSYLATED A1C: CPT

## 2018-11-11 PROCEDURE — 97116 GAIT TRAINING THERAPY: CPT

## 2018-11-11 PROCEDURE — 97530 THERAPEUTIC ACTIVITIES: CPT

## 2018-11-11 PROCEDURE — 97161 PT EVAL LOW COMPLEX 20 MIN: CPT

## 2018-11-11 PROCEDURE — 72100 X-RAY EXAM L-S SPINE 2/3 VWS: CPT

## 2018-11-11 PROCEDURE — 82962 GLUCOSE BLOOD TEST: CPT

## 2018-11-11 PROCEDURE — 93005 ELECTROCARDIOGRAM TRACING: CPT

## 2018-11-11 PROCEDURE — 85027 COMPLETE CBC AUTOMATED: CPT

## 2018-11-11 PROCEDURE — 84100 ASSAY OF PHOSPHORUS: CPT

## 2018-11-11 PROCEDURE — 74019 RADEX ABDOMEN 2 VIEWS: CPT

## 2018-11-11 PROCEDURE — 85730 THROMBOPLASTIN TIME PARTIAL: CPT

## 2018-11-11 PROCEDURE — 36415 COLL VENOUS BLD VENIPUNCTURE: CPT

## 2018-11-11 PROCEDURE — 85610 PROTHROMBIN TIME: CPT

## 2018-11-11 PROCEDURE — 97110 THERAPEUTIC EXERCISES: CPT

## 2018-11-11 PROCEDURE — 84443 ASSAY THYROID STIM HORMONE: CPT

## 2018-11-11 PROCEDURE — 84484 ASSAY OF TROPONIN QUANT: CPT

## 2018-11-11 PROCEDURE — 84480 ASSAY TRIIODOTHYRONINE (T3): CPT

## 2018-11-11 PROCEDURE — 82306 VITAMIN D 25 HYDROXY: CPT

## 2018-11-11 PROCEDURE — 80048 BASIC METABOLIC PNL TOTAL CA: CPT

## 2018-11-11 PROCEDURE — 99285 EMERGENCY DEPT VISIT HI MDM: CPT | Mod: 25

## 2018-11-11 PROCEDURE — 72170 X-RAY EXAM OF PELVIS: CPT

## 2018-11-11 PROCEDURE — 81001 URINALYSIS AUTO W/SCOPE: CPT

## 2019-11-18 NOTE — ED ADULT TRIAGE NOTE - NS ED NURSE AMBULANCES
PAST SURGICAL HISTORY:  H/O knee surgery bilateral patella realignment    S/P bilateral breast reduction age 24    S/P cholecystectomy 1993    S/P colon resection surgery March 2013 with temporary colostomy til 9/13
[facility], [place]

## 2020-12-29 NOTE — ED PROVIDER NOTE - ST/T WAVE
2 week hearing aid fitting recheck:  The patient is reporting discomfort from wearing his hearing aids. The problem has seemed to resolve in his right ear but continues in his left ear. Otoscopy was clear in his right ear but revealed a very small scabbed over sore in his left ear which when touch was very sore. I recommended that Edita Harper not wear his hearing aid in his left ear for several days to allow the sore to heal.  I also dispensed small open domes for him to try but I feel the medium open domes fit more appropriately. He is not entirely pleased with the Tap Control but does not wish to activate it at this time. We discussed the function of the push button. His hearing aids were also paired with his phone and the sarah. He was instucted on the use of the [facility] sarah. Speech Mapping was not completed due to the sore in the left ear.   HA recheck scheduled for   01/08/2021 no stemi

## 2021-07-27 NOTE — ED ADULT NURSE NOTE - NSFALLRSKUNASSIST_ED_ALL_ED
Show Applicator Variable?: Yes Render Note In Bullet Format When Appropriate: No Detail Level: Detailed Post-Care Instructions: I reviewed with the patient in detail post-care instructions. Patient is to wear sunprotection, and avoid picking at any of the treated lesions. Pt may apply Vaseline to crusted or scabbing areas. Number Of Freeze-Thaw Cycles: 2 freeze-thaw cycles Consent: The patient's consent was obtained including but not limited to risks of crusting, scabbing, blistering, scarring, darker or lighter pigmentary change, recurrence, incomplete removal and infection. Duration Of Freeze Thaw-Cycle (Seconds): 5 no

## 2022-02-07 ENCOUNTER — APPOINTMENT (OUTPATIENT)
Dept: ORTHOPEDIC SURGERY | Facility: CLINIC | Age: 87
End: 2022-02-07
Payer: MEDICARE

## 2022-02-07 VITALS
HEIGHT: 64 IN | BODY MASS INDEX: 25.61 KG/M2 | DIASTOLIC BLOOD PRESSURE: 76 MMHG | SYSTOLIC BLOOD PRESSURE: 154 MMHG | WEIGHT: 150 LBS | HEART RATE: 87 BPM

## 2022-02-07 DIAGNOSIS — M48.07 SPINAL STENOSIS, LUMBOSACRAL REGION: ICD-10-CM

## 2022-02-07 DIAGNOSIS — M51.36 OTHER INTERVERTEBRAL DISC DEGENERATION, LUMBAR REGION: ICD-10-CM

## 2022-02-07 DIAGNOSIS — M25.559 PAIN IN UNSPECIFIED HIP: ICD-10-CM

## 2022-02-07 PROCEDURE — 72170 X-RAY EXAM OF PELVIS: CPT

## 2022-02-07 PROCEDURE — 99204 OFFICE O/P NEW MOD 45 MIN: CPT

## 2022-02-07 RX ORDER — METHYLPREDNISOLONE 4 MG/1
4 TABLET ORAL
Qty: 2 | Refills: 2 | Status: ACTIVE | COMMUNITY
Start: 2022-02-07 | End: 1900-01-01

## 2022-07-05 ENCOUNTER — TRANSCRIPTION ENCOUNTER (OUTPATIENT)
Age: 87
End: 2022-07-05

## 2022-07-06 ENCOUNTER — EMERGENCY (EMERGENCY)
Facility: HOSPITAL | Age: 87
LOS: 1 days | Discharge: HOME | End: 2022-07-06
Attending: EMERGENCY MEDICINE
Payer: MEDICARE

## 2022-07-06 VITALS
SYSTOLIC BLOOD PRESSURE: 154 MMHG | HEIGHT: 64 IN | WEIGHT: 154.98 LBS | OXYGEN SATURATION: 99 % | RESPIRATION RATE: 18 BRPM | TEMPERATURE: 97 F | DIASTOLIC BLOOD PRESSURE: 79 MMHG | HEART RATE: 96 BPM

## 2022-07-06 LAB
ALBUMIN SERPL ELPH-MCNC: 4.8 G/DL — SIGNIFICANT CHANGE UP (ref 3.3–5)
ALP SERPL-CCNC: 102 U/L — SIGNIFICANT CHANGE UP (ref 40–120)
ALT FLD-CCNC: 12 U/L — SIGNIFICANT CHANGE UP (ref 10–45)
ANION GAP SERPL CALC-SCNC: 13 MMOL/L — SIGNIFICANT CHANGE UP (ref 5–17)
APTT BLD: 33.5 SEC — SIGNIFICANT CHANGE UP (ref 27.5–35.5)
AST SERPL-CCNC: 13 U/L — SIGNIFICANT CHANGE UP (ref 10–40)
BASOPHILS # BLD AUTO: 0.03 K/UL — SIGNIFICANT CHANGE UP (ref 0–0.2)
BASOPHILS NFR BLD AUTO: 0.4 % — SIGNIFICANT CHANGE UP (ref 0–2)
BILIRUB SERPL-MCNC: 0.3 MG/DL — SIGNIFICANT CHANGE UP (ref 0.2–1.2)
BUN SERPL-MCNC: 44 MG/DL — HIGH (ref 7–23)
CALCIUM SERPL-MCNC: 10.3 MG/DL — SIGNIFICANT CHANGE UP (ref 8.4–10.5)
CHLORIDE SERPL-SCNC: 99 MMOL/L — SIGNIFICANT CHANGE UP (ref 96–108)
CO2 SERPL-SCNC: 25 MMOL/L — SIGNIFICANT CHANGE UP (ref 22–31)
CREAT SERPL-MCNC: 1.38 MG/DL — HIGH (ref 0.5–1.3)
EGFR: 38 ML/MIN/1.73M2 — LOW
EOSINOPHIL # BLD AUTO: 0.06 K/UL — SIGNIFICANT CHANGE UP (ref 0–0.5)
EOSINOPHIL NFR BLD AUTO: 0.9 % — SIGNIFICANT CHANGE UP (ref 0–6)
GLUCOSE SERPL-MCNC: 155 MG/DL — HIGH (ref 70–99)
HCT VFR BLD CALC: 36.9 % — SIGNIFICANT CHANGE UP (ref 34.5–45)
HGB BLD-MCNC: 12.4 G/DL — SIGNIFICANT CHANGE UP (ref 11.5–15.5)
IMM GRANULOCYTES NFR BLD AUTO: 0.1 % — SIGNIFICANT CHANGE UP (ref 0–1.5)
INR BLD: 1.15 RATIO — SIGNIFICANT CHANGE UP (ref 0.88–1.16)
LYMPHOCYTES # BLD AUTO: 2.14 K/UL — SIGNIFICANT CHANGE UP (ref 1–3.3)
LYMPHOCYTES # BLD AUTO: 30.8 % — SIGNIFICANT CHANGE UP (ref 13–44)
MCHC RBC-ENTMCNC: 30.2 PG — SIGNIFICANT CHANGE UP (ref 27–34)
MCHC RBC-ENTMCNC: 33.6 GM/DL — SIGNIFICANT CHANGE UP (ref 32–36)
MCV RBC AUTO: 89.8 FL — SIGNIFICANT CHANGE UP (ref 80–100)
MONOCYTES # BLD AUTO: 0.45 K/UL — SIGNIFICANT CHANGE UP (ref 0–0.9)
MONOCYTES NFR BLD AUTO: 6.5 % — SIGNIFICANT CHANGE UP (ref 2–14)
NEUTROPHILS # BLD AUTO: 4.25 K/UL — SIGNIFICANT CHANGE UP (ref 1.8–7.4)
NEUTROPHILS NFR BLD AUTO: 61.3 % — SIGNIFICANT CHANGE UP (ref 43–77)
NRBC # BLD: 0 /100 WBCS — SIGNIFICANT CHANGE UP (ref 0–0)
PLATELET # BLD AUTO: 138 K/UL — LOW (ref 150–400)
POTASSIUM SERPL-MCNC: 4.2 MMOL/L — SIGNIFICANT CHANGE UP (ref 3.5–5.3)
POTASSIUM SERPL-SCNC: 4.2 MMOL/L — SIGNIFICANT CHANGE UP (ref 3.5–5.3)
PROT SERPL-MCNC: 7.5 G/DL — SIGNIFICANT CHANGE UP (ref 6–8.3)
PROTHROM AB SERPL-ACNC: 13.2 SEC — SIGNIFICANT CHANGE UP (ref 10.5–13.4)
RBC # BLD: 4.11 M/UL — SIGNIFICANT CHANGE UP (ref 3.8–5.2)
RBC # FLD: 13.2 % — SIGNIFICANT CHANGE UP (ref 10.3–14.5)
SODIUM SERPL-SCNC: 137 MMOL/L — SIGNIFICANT CHANGE UP (ref 135–145)
WBC # BLD: 6.94 K/UL — SIGNIFICANT CHANGE UP (ref 3.8–10.5)
WBC # FLD AUTO: 6.94 K/UL — SIGNIFICANT CHANGE UP (ref 3.8–10.5)

## 2022-07-06 PROCEDURE — 73110 X-RAY EXAM OF WRIST: CPT | Mod: 26,50

## 2022-07-06 PROCEDURE — 70486 CT MAXILLOFACIAL W/O DYE: CPT | Mod: 26,MA

## 2022-07-06 PROCEDURE — 72125 CT NECK SPINE W/O DYE: CPT | Mod: 26,MA

## 2022-07-06 PROCEDURE — 99284 EMERGENCY DEPT VISIT MOD MDM: CPT | Mod: GC

## 2022-07-06 PROCEDURE — 70450 CT HEAD/BRAIN W/O DYE: CPT | Mod: 26,MA

## 2022-07-06 PROCEDURE — 93010 ELECTROCARDIOGRAM REPORT: CPT

## 2022-07-06 RX ORDER — CEPHALEXIN 500 MG
1 CAPSULE ORAL
Qty: 10 | Refills: 0
Start: 2022-07-06 | End: 2022-07-10

## 2022-07-06 RX ORDER — ACETAMINOPHEN 500 MG
650 TABLET ORAL ONCE
Refills: 0 | Status: DISCONTINUED | OUTPATIENT
Start: 2022-07-06 | End: 2022-07-10

## 2022-07-06 RX ORDER — TETANUS TOXOID, REDUCED DIPHTHERIA TOXOID AND ACELLULAR PERTUSSIS VACCINE, ADSORBED 5; 2.5; 8; 8; 2.5 [IU]/.5ML; [IU]/.5ML; UG/.5ML; UG/.5ML; UG/.5ML
0.5 SUSPENSION INTRAMUSCULAR ONCE
Refills: 0 | Status: COMPLETED | OUTPATIENT
Start: 2022-07-06 | End: 2022-07-06

## 2022-07-06 RX ORDER — CEPHALEXIN 500 MG
500 CAPSULE ORAL ONCE
Refills: 0 | Status: COMPLETED | OUTPATIENT
Start: 2022-07-06 | End: 2022-07-06

## 2022-07-06 RX ADMIN — TETANUS TOXOID, REDUCED DIPHTHERIA TOXOID AND ACELLULAR PERTUSSIS VACCINE, ADSORBED 0.5 MILLILITER(S): 5; 2.5; 8; 8; 2.5 SUSPENSION INTRAMUSCULAR at 20:59

## 2022-07-06 NOTE — ED PROVIDER NOTE - NSICDXPASTMEDICALHX_GEN_ALL_CORE_FT
PAST MEDICAL HISTORY:  Aortic stenosis severe    Breast cancer left breast    Hyperlipidemia     Hypertension     Lumbar herniated disc     Paroxysmal a-fib     Spinal stenosis of lumbar region

## 2022-07-06 NOTE — ED ADULT NURSE NOTE - OBJECTIVE STATEMENT
86 yo F w/ PMHx of herniated disc, spinal stenosis, HLD, HTN, afib (on xarelto), aortic stenosis, breast CA, presents to ED via EMS c/o facial injury s/p mechanical trip and fall. Pt reports she tripped and fell to pavement after dinner, falling onto outstretched wrists and hitting face on pavement. Pt endorsing facial/mouth pain and b/l wrist pain. No obvious deformities noted to wrists. Blood present to mouth, no active bleeding noted, no airway obstruction, pt maintaining airway and secretions w/o difficulty, speaking in full complete sentences. Swelling noted to R anterior scalp. PERRL. Pt denies neck/back pain. C-collar in place by EMS on arrival. Pt denies LOC, dizziness, weakness, HA, changes in vision. Pt denies any CP, SOB, cough, N/V, fever, chills, urinary complaints, constipation, diarrhea, HA, dizziness, weakness. Pt A&Ox4, lungs CTA, +central pulses. Abdomen soft, not tender, not distended. Ambulatory at baseline independent w/ steady gait, safety and comfort maintained, no acute distress noted at this time. Pt denies any recent travel or known sick contacts.

## 2022-07-06 NOTE — ED PROVIDER NOTE - OBJECTIVE STATEMENT
87F PHM afib on Xarelto CC s/p fall. Pt was at dinner and fell forward, hit head on pavement, endorsing pain in left wrist and mouth. PT has no LOC and has no dizziness or lightheadedness prior or after the fall. No n/v after fall. 87F PHM afib on Xarelto CC s/p fall. Pt was at dinner and fell forward, hit head on pavement, endorsing pain in left wrist and mouth. PT has no LOC and has no dizziness or lightheadedness prior or after the fall. No n/v after fall.  Denies any etoh or drug usage today.  no known tdap status 87F PHM afib on Xarelto CC s/p fall. Pt was at dinner and fell forward, hit head on pavement, endorsing pain in left wrist and mouth. PT has no LOC and has no dizziness or lightheadedness prior or after the fall. No n/v after fall.  Denies any etoh or drug usage today.  no known tdap status  son phone number

## 2022-07-06 NOTE — ED PROVIDER NOTE - PROGRESS NOTE DETAILS
Gaudencio PGY 2 spoke w/ son Dr Leger in updates in terms of what we are planning on doing w/ pt pt agrees Attending MD Garcia: CTs noted, plastics consulted for full thickness lip lac Gaudencio PGY 2 plastics at bedside Gaudencio PGY 2 spoke w/ son will arrange for transport in morning there is nobody that is able to receive pt at this time at her place of residence Gaudencio PGY 2 pt able to ambulate w/ slight assistance Gaudencio PGY 2 non emergent called

## 2022-07-06 NOTE — ED PROVIDER NOTE - NSICDXPASTSURGICALHX_GEN_ALL_CORE_FT
PAST SURGICAL HISTORY:  AF (paroxysmal atrial fibrillation) x 2 cardioversions - 9/2008, 2009 - chemical    Obesity (BMI 30-39.9)     S/P hysterectomy 2003    S/P lumpectomy of breast left breast - with radiation 1990    S/P mastectomy, bilateral january 2002    S/P shoulder surgery right humerus fracture repair 1999

## 2022-07-06 NOTE — ED PROVIDER NOTE - CARE PROVIDER_API CALL
Kosta Bacon)  Plastic Surgery; Surgery; Surgical Critical Care  [address] [place] [address]  Phone: [phone]  Fax: [phone]  Follow Up Time: 7-10 Days

## 2022-07-06 NOTE — ED PROVIDER NOTE - NSFOLLOWUPINSTRUCTIONS_ED_ALL_ED_FT
Today you were seen in the ED for a fall     It was found that you have **********    Please follow up with your primary care provider in regards to today's visit.       Call 911 or have someone else call if:   You have fallen and are unconscious.  You have fallen and cannot move part of your body  Contact your healthcare provider if:   You have fallen and have pain or a headache.  You have questions or concerns about your condition or care.  You become dizziness / lightheaded / develop new onset of nausea and vomiting Today you were seen in the ED for a fall     It was found that you have no signs of having a bleed in your head.     A plastic surgeon repaired the bleeding in your mouth.   The sutures that were used are absorbable and do not need to be removed.  You were given a Tetanus shot today in the ED as well.      Please follow up with your primary care provider in regards to today's visit.     Call 911 or have someone else call if:   You have fallen and are unconscious.  You have fallen and cannot move part of your body  Contact your healthcare provider if:   You have fallen and have pain or a headache.  You have questions or concerns about your condition or care.  You become dizziness / lightheaded / develop new onset of nausea and vomiting Today you were seen in the ED for a fall     It was found that you have no signs of having a bleed in your head.     A plastic surgeon repaired the bleeding in your mouth.   The sutures that were used are absorbable and do not need to be removed.  You were given a Tetanus shot today in the ED as well.    You were given Keflex, please take for 5 days. they were sent to your pharmacy. Please follow up with Dr Bacon in 2 weeks.      Please follow up with your primary care provider in regards to today's visit.     Call 911 or have someone else call if:   You have fallen and are unconscious.  You have fallen and cannot move part of your body  Contact your healthcare provider if:   You have fallen and have pain or a headache.  You have questions or concerns about your condition or care.  You become dizziness / lightheaded / develop new onset of nausea and vomiting

## 2022-07-06 NOTE — ED PROVIDER NOTE - PATIENT PORTAL LINK FT
You can access the FollowMyHealth Patient Portal offered by [facility] by registering at the following website: [email or web address]. By joining [employer]’s FollowMyHealth portal, you will also be able to view your health information using other applications (apps) compatible with our system.

## 2022-07-06 NOTE — ED PROVIDER NOTE - ATTENDING CONTRIBUTION TO CARE
Attending MD Garcia: I personally have seen and examined this patient.  Resident note reviewed and agree on plan of care and except where noted.  See below for details.     Seen in [facility]    87F with PMH/PSH including AFib on Xarelto presents to the ED s/p fall.  Reports that while at dinner, fell forward hitting head on ground.  Reports L wrist and mouth pain at present.  Denies preceding dizziness, weakness, sensory changes.  Denies LOC. Denies chest pain, shortness of breath, palpitations. Denies abdominal pain, nausea, vomiting, diarrhea, bloody or black stools. Denies loss of urinary or bowel continence. Denies numbness, weakness or tingling in extremities. Denies urinary complaints.    Exam:   General: NAD  HENT: head NCAT, airway patent, oropharynx with blood secondary to L upper lip full thickness llaceration, no loose teeth, edema to upper lip, no tongue lac  Eyes: PERRL, EOMI  Lungs: lungs CTAB with good inspiratory effort, no wheezing, no rhonchi, no rales  Cardiac: +S1S2, no obvious m/r/g  GI: abdomen soft with +BS, NT, ND  : no CVAT  MSK: in C collar, no tenderness to midline palpation, no stepoffs along length of spine, FROM bilateral wrists, no tenderness to anatomical snuff box or with axial loading of thumb  Neuro: CN 2-12 grossly intact, moving all extremities spontaneously, sensory grossly intact, no gross neuro deficits  Psych: normal mood and affect     A/P: 87F s/p fall, lip lac repair for plastics, will consult, will obtain CTH for ICH, CTMF for facial fracture, CT C spine given mechanism for bony injury, will obtain labs, EKG, update tetanus, analgesia, ereassess

## 2022-07-06 NOTE — ED PROVIDER NOTE - CLINICAL SUMMARY MEDICAL DECISION MAKING FREE TEXT BOX
87F on bt cc s/p fall given hx and physical concern for fx vs bleed will get labs and imaging and ekg

## 2022-07-06 NOTE — ED ADULT NURSE REASSESSMENT NOTE - NS ED NURSE REASSESS COMMENT FT1
Pt has visual swelling around her eyes and the upper bridge of her nose, swelling/bruising is primarily on her right cheek bone. Pt has a small cut on her outer upper lip with visual bruising and swelling. Pt also has a laceration on her upper inner lip that is bleeding. Oral care was performed in efforts to clean blood from mouth. Pt was reposition to semi-Fowlers. Gauze were provided and placed on laceration to control bleeding. Cold compression provided for right cheek. Pt denies any needs at the moment, and doesn't report any pain. ED Attending Radha made aware.

## 2022-07-06 NOTE — ED PROVIDER NOTE - PHYSICAL EXAMINATION
GENERAL: Awake, alert, NAD present in collar   HEENT: no c spine tenderness, ecchymosis over left forehead no active bleeding, midline gum upper lac w/ minimal bleeding   LUNGS: CTAB, no wheezes or crackles   CARDIAC: RRR, no m/r/g  ABDOMEN: Soft, non tender, non distended, no rebound, no guarding  BACK: No midline spinal tenderness,   EXT: No edema, no calf tenderness, 2+ DP pulses bilaterally, no deformities.  NEURO: A&Ox3. Moving all extremities. no focal neurological deficits   SKIN: Warm and dry. No rash.  PSYCH: Normal affect.

## 2022-07-07 VITALS
DIASTOLIC BLOOD PRESSURE: 66 MMHG | HEART RATE: 85 BPM | RESPIRATION RATE: 16 BRPM | SYSTOLIC BLOOD PRESSURE: 169 MMHG | OXYGEN SATURATION: 99 % | TEMPERATURE: 98 F

## 2022-07-07 PROCEDURE — 73110 X-RAY EXAM OF WRIST: CPT

## 2022-07-07 PROCEDURE — 13152 CMPLX RPR E/N/E/L 2.6-7.5 CM: CPT

## 2022-07-07 PROCEDURE — 85730 THROMBOPLASTIN TIME PARTIAL: CPT

## 2022-07-07 PROCEDURE — 70450 CT HEAD/BRAIN W/O DYE: CPT | Mod: MA

## 2022-07-07 PROCEDURE — 93005 ELECTROCARDIOGRAM TRACING: CPT | Mod: XU

## 2022-07-07 PROCEDURE — 99285 EMERGENCY DEPT VISIT HI MDM: CPT | Mod: 25

## 2022-07-07 PROCEDURE — 85025 COMPLETE CBC W/AUTO DIFF WBC: CPT

## 2022-07-07 PROCEDURE — 85610 PROTHROMBIN TIME: CPT

## 2022-07-07 PROCEDURE — 76377 3D RENDER W/INTRP POSTPROCES: CPT

## 2022-07-07 PROCEDURE — 36415 COLL VENOUS BLD VENIPUNCTURE: CPT

## 2022-07-07 PROCEDURE — 90471 IMMUNIZATION ADMIN: CPT

## 2022-07-07 PROCEDURE — 90715 TDAP VACCINE 7 YRS/> IM: CPT

## 2022-07-07 PROCEDURE — 80053 COMPREHEN METABOLIC PANEL: CPT

## 2022-07-07 PROCEDURE — 72125 CT NECK SPINE W/O DYE: CPT | Mod: MA

## 2022-07-07 PROCEDURE — 70486 CT MAXILLOFACIAL W/O DYE: CPT | Mod: MA

## 2022-07-07 RX ADMIN — Medication 500 MILLIGRAM(S): at 00:16

## 2022-07-07 NOTE — ED ADULT NURSE REASSESSMENT NOTE - NS ED NURSE REASSESS COMMENT FT1
[facility] here for transport. Pt. spoke to her son on the phone and is ok with discharge back to sending facility. Pt A/O x3, NAD. IV D/C'd, no redness, swelling, bleeding. D/C via stretcher to assisted living.

## 2023-02-11 NOTE — ED PROVIDER NOTE - EKG #1 DATE/TIME
10-Feb-2023 15:50 06-Jul-2022 20:48 High Dose Vitamin A Pregnancy And Lactation Text: High dose vitamin A therapy is contraindicated during pregnancy and breast feeding.

## 2023-05-18 NOTE — ED ADULT NURSE REASSESSMENT NOTE - NS ED NURSE REASSESS COMMENT FT1
@[id number] Rec'd report from MARIAMA Solis RN.  [id number]. Pt A/O x3, sitting up in bed, awaiting disposition. Initial (On Arrival)

## 2024-06-19 NOTE — ED ADULT NURSE NOTE - NS ED NURSE REPORT GIVEN DT
Just spoke with patient and she states an allergy referral was sent to [facility] and the declined  Brigid Mosqueda on 6/19/2024 at 2:02 PM     26-Oct-2018 02:11

## 2025-03-19 NOTE — ED ADULT NURSE NOTE - NS ED NURSE LEVEL OF CONSCIOUSNESS AFFECT
Patient Appointment Form:  scheduled from referral  PCP: Dr Vo  Referring: Dr Vo  Has the Patient:  Seen a Cardiologist? Yes  date: 2019 Physician: Dr Valentin location: [facility] office  Had a heart catheterization? No  Had heart surgery? No  Had a stress test or nuclear stress test? Yes  date: 2018   Had an echocardiogram? Yes  date: 5/19 facility name :[facility]  Had a vascular ultrasound? No  Had a 24/48 heart monitor or extended cardiac event monitor?  No:   Had recent blood work in the last 6 months? Yes  date: 2/6/25 ordering physician: Dr. August  Had a pacemaker/ICD/ILR implant? No:  Seen an Electrophysiologist?  No  Had a cardiac ablation? Yes: Date, Dr ___, Location or No  Will send records via: in Epic  Date & time of appointment: 7/8/25 at 10:00 Dr Desai   Calm

## 2025-05-19 ENCOUNTER — NON-APPOINTMENT (OUTPATIENT)
Age: 89
End: 2025-05-19